# Patient Record
Sex: MALE | Race: WHITE | NOT HISPANIC OR LATINO | URBAN - METROPOLITAN AREA
[De-identification: names, ages, dates, MRNs, and addresses within clinical notes are randomized per-mention and may not be internally consistent; named-entity substitution may affect disease eponyms.]

---

## 2018-01-30 ENCOUNTER — INPATIENT (INPATIENT)
Facility: HOSPITAL | Age: 83
LOS: 6 days | Discharge: HOME | DRG: 315 | End: 2018-02-06
Attending: INTERNAL MEDICINE | Admitting: INTERNAL MEDICINE
Payer: MEDICARE

## 2018-01-30 VITALS
HEART RATE: 97 BPM | SYSTOLIC BLOOD PRESSURE: 110 MMHG | RESPIRATION RATE: 20 BRPM | DIASTOLIC BLOOD PRESSURE: 52 MMHG | OXYGEN SATURATION: 99 %

## 2018-01-30 DIAGNOSIS — J44.9 CHRONIC OBSTRUCTIVE PULMONARY DISEASE, UNSPECIFIED: ICD-10-CM
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
## 2018-01-30 DIAGNOSIS — I48.91 UNSPECIFIED ATRIAL FIBRILLATION: ICD-10-CM

## 2018-01-30 LAB
ALBUMIN SERPL ELPH-MCNC: 3.8 G/DL — SIGNIFICANT CHANGE UP (ref 3.3–5)
ALP SERPL-CCNC: 141 U/L — HIGH (ref 40–120)
ALT FLD-CCNC: 8 U/L RC — LOW (ref 10–45)
APTT BLD: 48.3 SEC — HIGH (ref 27.5–37.4)
AST SERPL-CCNC: 28 U/L — SIGNIFICANT CHANGE UP (ref 10–40)
BASE EXCESS BLDV CALC-SCNC: -3.5 MMOL/L — LOW (ref -2–2)
BASOPHILS # BLD AUTO: 0 K/UL — SIGNIFICANT CHANGE UP (ref 0–0.2)
BASOPHILS NFR BLD AUTO: 0.1 % — SIGNIFICANT CHANGE UP (ref 0–2)
BILIRUB SERPL-MCNC: 2.4 MG/DL — HIGH (ref 0.2–1.2)
BUN SERPL-MCNC: 57 MG/DL — HIGH (ref 7–23)
CA-I SERPL-SCNC: 1.1 MMOL/L — LOW (ref 1.12–1.3)
CALCIUM SERPL-MCNC: 8.9 MG/DL — SIGNIFICANT CHANGE UP (ref 8.4–10.5)
CHLORIDE BLDV-SCNC: 107 MMOL/L — SIGNIFICANT CHANGE UP (ref 96–108)
CHLORIDE SERPL-SCNC: 101 MMOL/L — SIGNIFICANT CHANGE UP (ref 96–108)
CK MB BLD-MCNC: 1.2 % — SIGNIFICANT CHANGE UP (ref 0–3.5)
CK MB CFR SERPL CALC: 7.4 NG/ML — HIGH (ref 0–6.7)
CK SERPL-CCNC: 636 U/L — HIGH (ref 30–200)
CO2 BLDV-SCNC: 22 MMOL/L — SIGNIFICANT CHANGE UP (ref 22–30)
CO2 SERPL-SCNC: 20 MMOL/L — LOW (ref 22–31)
CREAT SERPL-MCNC: 2.26 MG/DL — HIGH (ref 0.5–1.3)
EOSINOPHIL # BLD AUTO: 0 K/UL — SIGNIFICANT CHANGE UP (ref 0–0.5)
EOSINOPHIL NFR BLD AUTO: 0.1 % — SIGNIFICANT CHANGE UP (ref 0–6)
GAS PNL BLDV: 136 MMOL/L — SIGNIFICANT CHANGE UP (ref 136–145)
GAS PNL BLDV: SIGNIFICANT CHANGE UP
GAS PNL BLDV: SIGNIFICANT CHANGE UP
GLUCOSE BLDV-MCNC: 119 MG/DL — HIGH (ref 70–99)
GLUCOSE SERPL-MCNC: 126 MG/DL — HIGH (ref 70–99)
HCO3 BLDV-SCNC: 21 MMOL/L — SIGNIFICANT CHANGE UP (ref 21–29)
HCT VFR BLD CALC: 31.7 % — LOW (ref 39–50)
HCT VFR BLDA CALC: 34 % — LOW (ref 39–50)
HGB BLD CALC-MCNC: 11.1 G/DL — LOW (ref 13–17)
HGB BLD-MCNC: 11.1 G/DL — LOW (ref 13–17)
HOROWITZ INDEX BLDV+IHG-RTO: SIGNIFICANT CHANGE UP
HYPOCHROMIA BLD QL: SLIGHT — SIGNIFICANT CHANGE UP
INR BLD: 4.42 RATIO — HIGH (ref 0.88–1.16)
LACTATE BLDV-MCNC: 2.3 MMOL/L — HIGH (ref 0.7–2)
LYMPHOCYTES # BLD AUTO: 1 K/UL — SIGNIFICANT CHANGE UP (ref 1–3.3)
LYMPHOCYTES # BLD AUTO: 6.3 % — LOW (ref 13–44)
MCHC RBC-ENTMCNC: 32.4 PG — SIGNIFICANT CHANGE UP (ref 27–34)
MCHC RBC-ENTMCNC: 34.9 GM/DL — SIGNIFICANT CHANGE UP (ref 32–36)
MCV RBC AUTO: 92.8 FL — SIGNIFICANT CHANGE UP (ref 80–100)
MONOCYTES # BLD AUTO: 1.7 K/UL — HIGH (ref 0–0.9)
MONOCYTES NFR BLD AUTO: 10.8 % — SIGNIFICANT CHANGE UP (ref 2–14)
NEUTROPHILS # BLD AUTO: 13.1 K/UL — HIGH (ref 1.8–7.4)
NEUTROPHILS NFR BLD AUTO: 82.7 % — HIGH (ref 43–77)
PCO2 BLDV: 39 MMHG — SIGNIFICANT CHANGE UP (ref 35–50)
PH BLDV: 7.35 — SIGNIFICANT CHANGE UP (ref 7.35–7.45)
PLAT MORPH BLD: NORMAL — SIGNIFICANT CHANGE UP
PLATELET # BLD AUTO: 222 K/UL — SIGNIFICANT CHANGE UP (ref 150–400)
PO2 BLDV: 20 MMHG — LOW (ref 25–45)
POTASSIUM BLDV-SCNC: 4.4 MMOL/L — SIGNIFICANT CHANGE UP (ref 3.5–5)
POTASSIUM SERPL-MCNC: 4.4 MMOL/L — SIGNIFICANT CHANGE UP (ref 3.5–5.3)
POTASSIUM SERPL-SCNC: 4.4 MMOL/L — SIGNIFICANT CHANGE UP (ref 3.5–5.3)
PROT SERPL-MCNC: 7.4 G/DL — SIGNIFICANT CHANGE UP (ref 6–8.3)
PROTHROM AB SERPL-ACNC: 49.7 SEC — HIGH (ref 9.8–12.7)
RAPID RVP RESULT: SIGNIFICANT CHANGE UP
RBC # BLD: 3.42 M/UL — LOW (ref 4.2–5.8)
RBC # FLD: 14.6 % — HIGH (ref 10.3–14.5)
RBC BLD AUTO: SIGNIFICANT CHANGE UP
SAO2 % BLDV: 23 % — LOW (ref 67–88)
SODIUM SERPL-SCNC: 139 MMOL/L — SIGNIFICANT CHANGE UP (ref 135–145)
TROPONIN T SERPL-MCNC: 0.08 NG/ML — HIGH (ref 0–0.06)
WBC # BLD: 15.8 K/UL — HIGH (ref 3.8–10.5)
WBC # FLD AUTO: 15.8 K/UL — HIGH (ref 3.8–10.5)

## 2018-01-30 PROCEDURE — 71045 X-RAY EXAM CHEST 1 VIEW: CPT | Mod: 26

## 2018-01-30 PROCEDURE — 73502 X-RAY EXAM HIP UNI 2-3 VIEWS: CPT | Mod: 26,LT

## 2018-01-30 PROCEDURE — 72125 CT NECK SPINE W/O DYE: CPT | Mod: 26

## 2018-01-30 PROCEDURE — 76377 3D RENDER W/INTRP POSTPROCES: CPT | Mod: 26

## 2018-01-30 PROCEDURE — 93010 ELECTROCARDIOGRAM REPORT: CPT

## 2018-01-30 PROCEDURE — 70450 CT HEAD/BRAIN W/O DYE: CPT | Mod: 26

## 2018-01-30 PROCEDURE — 99285 EMERGENCY DEPT VISIT HI MDM: CPT | Mod: 25,GC

## 2018-01-30 PROCEDURE — 72192 CT PELVIS W/O DYE: CPT | Mod: 26

## 2018-01-30 RX ORDER — SACUBITRIL AND VALSARTAN 24; 26 MG/1; MG/1
1 TABLET, FILM COATED ORAL
Qty: 0 | Refills: 0 | Status: DISCONTINUED | OUTPATIENT
Start: 2018-01-30 | End: 2018-02-01

## 2018-01-30 RX ORDER — PANTOPRAZOLE SODIUM 20 MG/1
40 TABLET, DELAYED RELEASE ORAL
Qty: 0 | Refills: 0 | Status: DISCONTINUED | OUTPATIENT
Start: 2018-01-30 | End: 2018-02-06

## 2018-01-30 RX ORDER — ALBUTEROL 90 UG/1
2 AEROSOL, METERED ORAL EVERY 6 HOURS
Qty: 0 | Refills: 0 | Status: DISCONTINUED | OUTPATIENT
Start: 2018-01-30 | End: 2018-02-06

## 2018-01-30 RX ORDER — ATORVASTATIN CALCIUM 80 MG/1
40 TABLET, FILM COATED ORAL AT BEDTIME
Qty: 0 | Refills: 0 | Status: DISCONTINUED | OUTPATIENT
Start: 2018-01-30 | End: 2018-02-06

## 2018-01-30 RX ORDER — FUROSEMIDE 40 MG
40 TABLET ORAL DAILY
Qty: 0 | Refills: 0 | Status: DISCONTINUED | OUTPATIENT
Start: 2018-01-30 | End: 2018-02-01

## 2018-01-30 RX ORDER — TIOTROPIUM BROMIDE 18 UG/1
1 CAPSULE ORAL; RESPIRATORY (INHALATION) DAILY
Qty: 0 | Refills: 0 | Status: DISCONTINUED | OUTPATIENT
Start: 2018-01-30 | End: 2018-02-06

## 2018-01-30 RX ORDER — ALLOPURINOL 300 MG
100 TABLET ORAL DAILY
Qty: 0 | Refills: 0 | Status: DISCONTINUED | OUTPATIENT
Start: 2018-01-30 | End: 2018-02-06

## 2018-01-30 RX ORDER — METOPROLOL TARTRATE 50 MG
50 TABLET ORAL DAILY
Qty: 0 | Refills: 0 | Status: DISCONTINUED | OUTPATIENT
Start: 2018-01-30 | End: 2018-02-01

## 2018-01-30 RX ORDER — DOCUSATE SODIUM 100 MG
100 CAPSULE ORAL THREE TIMES A DAY
Qty: 0 | Refills: 0 | Status: DISCONTINUED | OUTPATIENT
Start: 2018-01-30 | End: 2018-02-06

## 2018-01-30 RX ORDER — CLOPIDOGREL BISULFATE 75 MG/1
75 TABLET, FILM COATED ORAL DAILY
Qty: 0 | Refills: 0 | Status: DISCONTINUED | OUTPATIENT
Start: 2018-01-30 | End: 2018-02-06

## 2018-01-30 RX ORDER — IPRATROPIUM/ALBUTEROL SULFATE 18-103MCG
3 AEROSOL WITH ADAPTER (GRAM) INHALATION ONCE
Qty: 0 | Refills: 0 | Status: COMPLETED | OUTPATIENT
Start: 2018-01-30 | End: 2018-01-30

## 2018-01-30 RX ADMIN — ATORVASTATIN CALCIUM 40 MILLIGRAM(S): 80 TABLET, FILM COATED ORAL at 22:22

## 2018-01-30 RX ADMIN — SACUBITRIL AND VALSARTAN 1 TABLET(S): 24; 26 TABLET, FILM COATED ORAL at 22:22

## 2018-01-30 RX ADMIN — Medication 100 MILLIGRAM(S): at 22:33

## 2018-01-30 RX ADMIN — Medication 3 MILLILITER(S): at 11:34

## 2018-01-30 RX ADMIN — ALBUTEROL 2 PUFF(S): 90 AEROSOL, METERED ORAL at 22:24

## 2018-01-30 RX ADMIN — Medication 50 MILLIGRAM(S): at 22:22

## 2018-01-30 NOTE — H&P ADULT - HISTORY OF PRESENT ILLNESS
88M pmhx htn, afib, chf, copd, here w/ L hip pain s/p fall while getting dressed this morning. Per pt he does not remember the incident very well but does not believe he lost consciousness or hit his head. He is on bloodthinners (plavix and coumadin) for his aFib. Pt denies precipitating sx including cp or dizziness prior to the fall. At baseline pt is sob requiring 3 duonebs per day for his COPD. He is complaining of his usual sob today as well. Pt also denies recent f/c, ap/n/v/d, headaches, balance issues, cp 88M pmhx htn, afib, chf, copd, here w/ L hip pain s/p fall while getting dressed this morning. Per pt he does not remember the incident very well but does not believe he lost consciousness or hit his head. He is on bloodthinners (plavix and coumadin) for his aFib. Pt denies precipitating sx including cp or dizziness prior to the fall. At baseline pt is sob requiring 3 duonebs per day for his COPD. He is complaining of his usual sob today as well. Pt also denies recent f/c, ap/n/v/d, headaches, balance issues, or any other associated symptoms

## 2018-01-30 NOTE — H&P ADULT - NSHPLABSRESULTS_GEN_ALL_CORE
Lab Results:  CBC  CBC Full  -  ( 30 Jan 2018 10:29 )  WBC Count : 15.8 K/uL  Hemoglobin : 11.1 g/dL  Hematocrit : 31.7 %  Platelet Count - Automated : 222 K/uL  Mean Cell Volume : 92.8 fl  Mean Cell Hemoglobin : 32.4 pg  Mean Cell Hemoglobin Concentration : 34.9 gm/dL  Auto Neutrophil # : 13.1 K/uL  Auto Lymphocyte # : 1.0 K/uL  Auto Monocyte # : 1.7 K/uL  Auto Eosinophil # : 0.0 K/uL  Auto Basophil # : 0.0 K/uL  Auto Neutrophil % : 82.7 %  Auto Lymphocyte % : 6.3 %  Auto Monocyte % : 10.8 %  Auto Eosinophil % : 0.1 %  Auto Basophil % : 0.1 %    .		Differential:	[] Automated		[] Manual  Chemistry                        11.1   15.8  )-----------( 222      ( 30 Jan 2018 10:29 )             31.7     01-30    139  |  101  |  57<H>  ----------------------------<  126<H>  4.4   |  20<L>  |  2.26<H>    Ca    8.9      30 Jan 2018 10:29    TPro  7.4  /  Alb  3.8  /  TBili  2.4<H>  /  DBili  x   /  AST  28  /  ALT  8<L>  /  AlkPhos  141<H>  01-30    LIVER FUNCTIONS - ( 30 Jan 2018 10:29 )  Alb: 3.8 g/dL / Pro: 7.4 g/dL / ALK PHOS: 141 U/L / ALT: 8 U/L RC / AST: 28 U/L / GGT: x           PT/INR - ( 30 Jan 2018 10:29 )   PT: 49.7 sec;   INR: 4.42 ratio         PTT - ( 30 Jan 2018 10:29 )  PTT:48.3 sec          MICROBIOLOGY/CULTURES:      RADIOLOGY RESULTS: reviewed

## 2018-01-30 NOTE — ED PROVIDER NOTE - ATTENDING CONTRIBUTION TO CARE
Patient poor historian.  BIBEMS from assisted living after fall, c/o L hip pain.  Patient cannot give any details of fall to me.  C/O severe pain in L hip.  Also reporting shortness of breath, has baseline COPD.    On exam patient tachycardic, tachypneic, mildly hypotensive but awake and alert.  Irregular tachycardia, lungs diffusely wheezy with increased WOB, abdomen soft, non tender, L hip diffusely tenderness to palpation primarily over pubic ramus, no visible shortening or deformity.    Suspect underlying pathology causing fall, possible hypoxia secondary to COPD exacerbation given presenation versus cardiac etiology, plan for labs, duonebs, CXR, pelvis XR, screening CT head/c spine (no visible trauma but elderly and poor historian), pain control, RVP

## 2018-01-30 NOTE — ED PROVIDER NOTE - MEDICAL DECISION MAKING DETAILS
88M pmhx afib on coumadin, plavix, htn, chf, coming from assisted living facility here s/p fall while getting dressed this AM. Pt does not remember incident. On exam pt ttp in left hip. Difficult to range passively. No gross deformity. Plan for ct head, neck, cxr, xray L hip + pelvis, cbc, cmp, vbg, coags.

## 2018-01-30 NOTE — H&P ADULT - NSHPPHYSICALEXAM_GEN_ALL_CORE
General frail  PERRLA  Neurology: A&Ox3, nonfocal, [place] x 4  Respiratory: CTA B/L  CV: RRR, S1S2, no murmurs, rubs or gallops  Abdominal: Soft, NT, ND +BS, Last BM  Extremities: No edema, + peripheral pulses  Skin Normal

## 2018-01-30 NOTE — ED PROVIDER NOTE - OBJECTIVE STATEMENT
88M pmhx htn, afib, chf, copd, here s/p fall while getting dressed this morning. Per pt he does not remember the incident very well but does not believe he lost consciousness or hit his head. He is on bloodthinners (plavix and coumadin) for his aFib. Pt denies precipitating sx including cp or dizziness prior to the fall. Pt 88M pmhx htn, afib, chf, copd, here w/ L hip pain s/p fall while getting dressed this morning. Per pt he does not remember the incident very well but does not believe he lost consciousness or hit his head. He is on bloodthinners (plavix and coumadin) for his aFib. Pt denies precipitating sx including cp or dizziness prior to the fall. At baseline pt is sob requiring 3 duonebs per day for his COPD. He is complaining of his usual sob today as well. Pt also denies recent f/c, ap/n/v/d, headaches, balance issues, cp.

## 2018-01-30 NOTE — H&P ADULT - ASSESSMENT
88M pmhx htn, afib, chf, copd, here w/ L hip pain s/p fall while getting dressed this morning. Per pt he does not remember the incident very well but does not believe he lost consciousness or hit his head. He is on bloodthinners (plavix and coumadin) for his aFib. Pt denies precipitating sx including cp or dizziness prior to the fall. At baseline pt is sob requiring 3 duonebs per day for his COPD. He is complaining of his usual sob today as well. Pt also denies recent f/c, ap/n/v/d, headaches, balance issues, or any other associated symptoms

## 2018-01-30 NOTE — ED PROVIDER NOTE - PMH
Afib    CHF (congestive heart failure)    COPD (chronic obstructive pulmonary disease)    HTN (hypertension)

## 2018-01-30 NOTE — ED ADULT NURSE NOTE - OBJECTIVE STATEMENT
88y male BIBA from [facility] s/p fall. A&Ox3. Hx of HTN, CHF, COPD, GERD, HLD and afib on coumadin and plavix. EMS reports patient fell out of bed (1 foot off ground) this morning. 88y male BIBA from [facility] s/p fall. A&Ox3. Hx of HTN, CHF, COPD, GERD, HLD and afib on coumadin and plavix. EMS reports patient fell out of bed (1 foot off ground) this morning while trying to get dressed. Walks with walker at baseline. Patient did not hit head, no LOC. Patient c/o left hip pain only upon coughing. Denies taking pain medicine. No obvious deformities noted. Left leg is neither shortened or rotated. Patient also c/o dyspnea, takes 3 duonebs daily. EMS report b/l expiratory wheezes, administered 2 duonebs. Patient presents with b/l expiratory wheezes. Reports sob upon exertion. Denies chest pain, fever/chills, n/v/d.

## 2018-01-31 LAB
ALBUMIN SERPL ELPH-MCNC: 3.1 G/DL — LOW (ref 3.3–5)
ALP SERPL-CCNC: 121 U/L — HIGH (ref 40–120)
ALT FLD-CCNC: 12 U/L — SIGNIFICANT CHANGE UP (ref 10–45)
ANION GAP SERPL CALC-SCNC: 16 MMOL/L — SIGNIFICANT CHANGE UP (ref 5–17)
APTT BLD: 45.4 SEC — HIGH (ref 27.5–37.4)
AST SERPL-CCNC: 48 U/L — HIGH (ref 10–40)
BILIRUB DIRECT SERPL-MCNC: 0.4 MG/DL — HIGH (ref 0–0.2)
BILIRUB INDIRECT FLD-MCNC: 1.3 MG/DL — HIGH (ref 0.2–1)
BILIRUB SERPL-MCNC: 1.7 MG/DL — HIGH (ref 0.2–1.2)
BUN SERPL-MCNC: 58 MG/DL — HIGH (ref 7–23)
CALCIUM SERPL-MCNC: 8.4 MG/DL — SIGNIFICANT CHANGE UP (ref 8.4–10.5)
CHLORIDE SERPL-SCNC: 103 MMOL/L — SIGNIFICANT CHANGE UP (ref 96–108)
CK SERPL-CCNC: 815 U/L — HIGH (ref 30–200)
CO2 SERPL-SCNC: 20 MMOL/L — LOW (ref 22–31)
CREAT SERPL-MCNC: 1.78 MG/DL — HIGH (ref 0.5–1.3)
GLUCOSE SERPL-MCNC: 93 MG/DL — SIGNIFICANT CHANGE UP (ref 70–99)
HCT VFR BLD CALC: 29.5 % — LOW (ref 39–50)
HGB BLD-MCNC: 9.7 G/DL — LOW (ref 13–17)
INR BLD: 4.48 RATIO — HIGH (ref 0.88–1.16)
MCHC RBC-ENTMCNC: 28.8 PG — SIGNIFICANT CHANGE UP (ref 27–34)
MCHC RBC-ENTMCNC: 32.9 GM/DL — SIGNIFICANT CHANGE UP (ref 32–36)
MCV RBC AUTO: 87.5 FL — SIGNIFICANT CHANGE UP (ref 80–100)
PLATELET # BLD AUTO: 246 K/UL — SIGNIFICANT CHANGE UP (ref 150–400)
POTASSIUM SERPL-MCNC: 4.4 MMOL/L — SIGNIFICANT CHANGE UP (ref 3.5–5.3)
POTASSIUM SERPL-SCNC: 4.4 MMOL/L — SIGNIFICANT CHANGE UP (ref 3.5–5.3)
PROT SERPL-MCNC: 6.6 G/DL — SIGNIFICANT CHANGE UP (ref 6–8.3)
PROTHROM AB SERPL-ACNC: 52.2 SEC — HIGH (ref 10–13.1)
RBC # BLD: 3.37 M/UL — LOW (ref 4.2–5.8)
RBC # FLD: 15.5 % — HIGH (ref 10.3–14.5)
SODIUM SERPL-SCNC: 139 MMOL/L — SIGNIFICANT CHANGE UP (ref 135–145)
WBC # BLD: 14.15 K/UL — HIGH (ref 3.8–10.5)
WBC # FLD AUTO: 14.15 K/UL — HIGH (ref 3.8–10.5)

## 2018-01-31 PROCEDURE — 95819 EEG AWAKE AND ASLEEP: CPT | Mod: 26

## 2018-01-31 RX ORDER — TAMSULOSIN HYDROCHLORIDE 0.4 MG/1
0.4 CAPSULE ORAL AT BEDTIME
Qty: 0 | Refills: 0 | Status: DISCONTINUED | OUTPATIENT
Start: 2018-01-31 | End: 2018-02-06

## 2018-01-31 RX ORDER — ACETAMINOPHEN 500 MG
650 TABLET ORAL ONCE
Qty: 0 | Refills: 0 | Status: COMPLETED | OUTPATIENT
Start: 2018-01-31 | End: 2018-01-31

## 2018-01-31 RX ORDER — MESALAMINE 400 MG
800 TABLET, DELAYED RELEASE (ENTERIC COATED) ORAL
Qty: 0 | Refills: 0 | Status: DISCONTINUED | OUTPATIENT
Start: 2018-01-31 | End: 2018-02-06

## 2018-01-31 RX ADMIN — ATORVASTATIN CALCIUM 40 MILLIGRAM(S): 80 TABLET, FILM COATED ORAL at 22:10

## 2018-01-31 RX ADMIN — Medication 40 MILLIGRAM(S): at 05:33

## 2018-01-31 RX ADMIN — Medication 100 MILLIGRAM(S): at 05:33

## 2018-01-31 RX ADMIN — TAMSULOSIN HYDROCHLORIDE 0.4 MILLIGRAM(S): 0.4 CAPSULE ORAL at 22:10

## 2018-01-31 RX ADMIN — Medication 800 MILLIGRAM(S): at 22:09

## 2018-01-31 RX ADMIN — Medication 100 MILLIGRAM(S): at 07:00

## 2018-01-31 RX ADMIN — Medication 100 MILLIGRAM(S): at 11:24

## 2018-01-31 RX ADMIN — CLOPIDOGREL BISULFATE 75 MILLIGRAM(S): 75 TABLET, FILM COATED ORAL at 11:24

## 2018-01-31 RX ADMIN — SACUBITRIL AND VALSARTAN 1 TABLET(S): 24; 26 TABLET, FILM COATED ORAL at 05:33

## 2018-01-31 RX ADMIN — ALBUTEROL 2 PUFF(S): 90 AEROSOL, METERED ORAL at 17:10

## 2018-01-31 RX ADMIN — ALBUTEROL 2 PUFF(S): 90 AEROSOL, METERED ORAL at 11:22

## 2018-01-31 RX ADMIN — Medication 50 MILLIGRAM(S): at 05:33

## 2018-01-31 RX ADMIN — Medication 650 MILLIGRAM(S): at 17:07

## 2018-01-31 RX ADMIN — TIOTROPIUM BROMIDE 1 CAPSULE(S): 18 CAPSULE ORAL; RESPIRATORY (INHALATION) at 11:23

## 2018-01-31 RX ADMIN — SACUBITRIL AND VALSARTAN 1 TABLET(S): 24; 26 TABLET, FILM COATED ORAL at 17:10

## 2018-01-31 RX ADMIN — Medication 650 MILLIGRAM(S): at 17:30

## 2018-01-31 RX ADMIN — ALBUTEROL 2 PUFF(S): 90 AEROSOL, METERED ORAL at 05:33

## 2018-01-31 RX ADMIN — ALBUTEROL 2 PUFF(S): 90 AEROSOL, METERED ORAL at 22:13

## 2018-01-31 RX ADMIN — PANTOPRAZOLE SODIUM 40 MILLIGRAM(S): 20 TABLET, DELAYED RELEASE ORAL at 06:00

## 2018-01-31 NOTE — EEG REPORT - NS EEG TEXT BOX
[facility]   COMPREHENSIVE EPILEPSY CENTER   REPORT OF ROUTINE VIDEO EEG     [facility]: [address] [place] [address] [address], [place] [address], Ph#: [phone]  [facility]: [address], [place] [address], Ph#: [phone]  Office: [address] [facility], [address], [place] [address] Ph#: [phone]    Patient Name: KATTY GONSALEZ  Age and : 88y ([birth year])  MRN #: [id number]  Location: [facility] 4MON 401 D1    Study Date: 18    _____________________________________________________________  TECHNICAL INFORMATION    EEG Placement and Labeling of Electrodes:  The EEG was performed utilizing 20 channels referential EEG connections (coronal over temporal over parasagittal montage) using all standard 10-20 electrode placements with EKG.  Recording was at a sampling rate of 256 samples per second per channel.  Time synchronized digital video recording was done simultaneously with EEG recording.  A low light infrared camera was used for low light recording.  [place] and seizure detection algorithms were utilized.    _____________________________________________________________  HISTORY:  Patient is a 88y old  Male who presents with a chief complaint of L hip pain (2018 16:41)    PERTINENT MEDICATION:  None    _____________________________________________________________  STUDY INTERPRETATION    Background:  The background was continuous, spontaneously variable, reactive, and symmetric. Entire recording in drowsy and asleep states, predominantly consisted of theta and delta activities. No posteriorly dominant rhythm seen.    Sleep:  - Drowsiness was characterized by fragmentation, attenuation, and slowing of the background activity.    - Sleep was characterized by the presence of symmetric and normal vertex waves, sleep spindles, and K-complexes.    Non-epileptiform Paroxysmal Abnormalities:  None    Interictal Epileptiform Abnormalities:   None    Ictal Abnormalities:   No clinical events.  No electrographic seizures.    Activation Procedures:   Hyperventilation was not performed.    Photic stimulation was not performed.     Artifacts:  Intermittent myogenic and movement artifacts were noted.    ECG:  The heart rate on single channel ECG was predominantly between 80-90 BPM.    _____________________________________________________________  EEG CLASSIFICATION/SUMMARY:    Normal EEG in the drowsy and asleep states.    _____________________________________________________________  CLINICAL IMPRESSION:    Normal EEG study in the drowsy and asleep states.  No epileptic pattern or seizure seen.      Sj Eid MD  Attending Physician, [facility] [place]

## 2018-01-31 NOTE — CONSULT NOTE ADULT - ASSESSMENT
EKG - Afib LBBB    A/P     1) Syncope - will moniter on tele, get 2decho and get orthostatics , CT head, neurology on board    2) Chronic systolic CHF - on toprol, entresto, lasix, euvolemic, get 2d echo    3) Chronic afib - on coumadin INR > 4 hold coumadin for now    4) Leukocytosis - ?infection maybe get UA urine and blood cx    5) cad s/p PCI - no chest pains cont plavix

## 2018-01-31 NOTE — CONSULT NOTE ADULT - SUBJECTIVE AND OBJECTIVE BOX
[facility]        Patient is a 88y old  Male who presents with a chief complaint of L hip pain (2018 16:41)      HPI:  88M pmhx htn, afib, chf, copd, small left frontal infarct 3 years ago with mild expressive aphasia here w/ L hip pain s/p fall while getting dressed this morning. Per pt he does not remember the incident very well but does not believe he lost consciousness or hit his head. He is on bloodthinners (plavix and coumadin) for his aFib. Pt denies precipitating sx including cp or dizziness prior to the fall. At baseline pt is sob requiring 3 duonebs per day for his COPD. He is complaining of his usual sob today as well. Pt also denies recent f/c, ap/n/v/d, headaches, balance issues, or any other associated symptoms    Pt reportedly felt weak the night before, and went to bed early.  He does not recall the turn of events on the date of the admission. He was found on the floor unresponsive by the  at the [facility], he is unsure when he woke up and whether he had breakfast that morning.   According to daughter at bedside, she reports that he had recently developed mild renal insufficiency and was noted to have elevated potassium. His diet was changed since to low potassium.  According to the daughter, she notes mild b/l arm tremors today which were not previously seen.                  *****PAST MEDICAL / Surgical  HISTORY:  PAST MEDICAL & SURGICAL HISTORY:  Afib  HTN (hypertension)  CHF (congestive heart failure)  COPD (chronic obstructive pulmonary disease)  PAD (peripheral artery disease)  Hypercholesterolemia  Atrial fibrillation  Hypertension  No significant past surgical history  Abnormal coronary angiogram: 3 different times had a total of 5 stents placed  S/P aortic valve repair           *****FAMILY HISTORY:  FAMILY HISTORY:  No pertinent family history in first degree relatives  Family history of cerebrovascular accident           *****SOCIAL HISTORY:  Alcohol: None  Smoking: None         *****ALLERGIES:   Allergies    Apresoline (Unknown)  penicillin (Unknown)    Intolerances             *****MEDICATIONS: current medication reviewed and documented.   MEDICATIONS  (STANDING):  ALBUTerol    90 MICROgram(s) HFA Inhaler 2 Puff(s) Inhalation every 6 hours  allopurinol 100 milliGRAM(s) Oral daily  atorvastatin 40 milliGRAM(s) Oral at bedtime  clopidogrel Tablet 75 milliGRAM(s) Oral daily  docusate sodium 100 milliGRAM(s) Oral three times a day  furosemide    Tablet 40 milliGRAM(s) Oral daily  metoprolol succinate ER 50 milliGRAM(s) Oral daily  pantoprazole    Tablet 40 milliGRAM(s) Oral before breakfast  sacubitril 49 mG/valsartan 51 mG 1 Tablet(s) Oral two times a day  tamsulosin 0.4 milliGRAM(s) Oral at bedtime  tiotropium 18 MICROgram(s) Capsule 1 Capsule(s) Inhalation daily    MEDICATIONS  (PRN):           *****REVIEW OF SYSTEM:  GEN: no fever, no chills, no pain  RESP: no SOB, no cough, no sputum  CVS: no chest pain, no palpitations, no edema  GI: no abdominal pain, no nausea, no vomiting, no constipation, no diarrhea  : no dysurea, no frequency, no hematurea  Neuro: no headache, no dizziness  PSYCH: no anxiety, no depression  Derm : no itching, no rash         *****VITAL SIGNS:  T(C): 36.8 (18 @ 11:10), Max: 37.2 (18 @ 21:52)  HR: 101 (18 @ 11:10) (87 - 101)  BP: 94/54 (18 @ 11:10) (94/54 - 131/68)  RR: 17 (18 @ 11:10) (16 - 18)  SpO2: 98% (18 @ 11:10) (96% - 100%)  Wt(kg): --     @ 07:  -   @ 07:00  --------------------------------------------------------  IN: 945 mL / OUT: 350 mL / NET: 595 mL     @ 07:  -   @ 12:13  --------------------------------------------------------  IN: 480 mL / OUT: 100 mL / NET: 380 mL             *****PHYSICAL EXAM:     Alert oriented x 2 ( did not know the year or date)  Attention comprehension are fair. Able to name, repeat, read without any difficulty.   Able to follow 3 step commands.     EOMI fundi not visualized,  VFF to confrontration  No facial asymmetry   Tongue is midline   Palate elevates symmetrically   Moving both upper ext symmetrically no pronator drift.  RLE prox 3/5 distally 4/5   LLE prox 2/5 severely limited by pain ( fell on the left hip)    Gait : not assessed. usually walks with a walker.  B/L down going toes   b/l intention tremors/postural tremors  Also noted to have muscle fasciculations of his left upper ext.     >>> <<<         *****LAB AND IMAGIN.7    14.15 )-----------( 246      ( 2018 07:18 )             29.5                   139  |  103  |  58<H>  ----------------------------<  93  4.4   |  20<L>  |  1.78<H>    Ca    8.4      2018 07:34    TPro  7.4  /  Alb  3.8  /  TBili  2.4<H>  /  DBili  x   /  AST  28  /  ALT  8<L>  /  AlkPhos  141<H>  30    PT/INR - ( 2018 07:18 )   PT: 52.2 sec;   INR: 4.48 ratio         PTT - ( 2018 07:18 )  PTT:45.4 sec            CARDIAC MARKERS ( 2018 10:29 )  x     / 0.08 ng/mL / 636 U/L / x     / 7.4 ng/mL              < from: CT Head No Cont (18 @ 13:26) >  Head CT: No evidence for intracranial hemorrhage, mass effect, or   displaced calvarial fracture.    Cervical spine CT: No evidence for acute displaced fracture or traumatic   malalignment. Cervical degenerative spondylosis, as described above.       < end of copied text >      [All pertinent recent Imaging reports reviewed]         *****A S S E S S M E N T   A N D   P L A N :      88M pmhx htn, afib, chf, copd, small left frontal infarct 3 years ago with mild expressive aphasia here w/ L hip pain s/p fall while getting dressed this morning. Per pt he does not remember the incident very well but does not believe he lost consciousness or hit his head. He is on bloodthinners (plavix and coumadin) for his aFib. Pt denies precipitating sx including cp or dizziness prior to the fall. At baseline pt is sob requiring 3 duonebs per day for his COPD. He is complaining of his usual sob today as well. Pt also denies recent f/c, ap/n/v/d, headaches, balance issues, or any other associated symptoms    Pt reportedly felt weak the night before, and went to bed early.  He does not recall the turn of events on the date of the admission. He was found on the floor unresponsive by the  at the [facility], he is unsure when he woke up and whether he had breakfast that morning.   According to daughter at bedside, she reports that he had recently developed mild renal insufficiency and was noted to have elevated potassium. His diet was changed since to low potassium.     syncopal event r/o seizures given prolonged post ictal period. However, pt did not feel well the night before, with acute on chronic renal insufficiency which may have also triggered the syncopal event.   Trend CPK  EEG to r/o seizures.   CT head no acute process  pt consult for gait eval.     Tremors? related muscle weakness vs. related renal insufficiency vs. drug induced.   trend lfts,  Check ammonia level  Albuterol related ? if he on albuterol at home.       80 minutes spent on the total encounter;  more than 50 % of the visit was spent on counseling  and or coordinating care by the attending physician.    Thank you for allowing me to participate in the care of this blessing patient. Please do not hesitate to call me if you have any questions.   ___________________________  Will follow with you.  Thank you,  Mary Alice Santiago MD  Diplomate of the American Board of Neurology and Psychiatry.  Diplomate of the American Board of Vascular Neurology.   [facility]   Ph: [phone]      This and subsequent notes were partially created using voice recognition software and will  inherently be subject to errors including those of syntax and sound alike substitutions which may escape proofreading. In such instances original meaning may be extrapolated by contextual derivation.

## 2018-01-31 NOTE — PROVIDER CONTACT NOTE (OTHER) - ASSESSMENT
pt A&Ox4, vss, complaining of cough. Pt sounds congested & has infrequent nonproductive cough. Pt requesting cough drops. RVP resulted negative.

## 2018-01-31 NOTE — CONSULT NOTE ADULT - SUBJECTIVE AND OBJECTIVE BOX
Lazaro Ramirez MD  Interventional Cardiology  [place] Office : [address] [place] [address] [place] [address]  Tel:   [place] Office : [address] [facility] [place] [address]  Tel: [phone]  Cell : [phone]      CHIEF COMPLAINT: syncope    HISTORY OF PRESENT ILLNESS:  88M pmhx htn, cad s/p mi s/p 5 stents, s/p TAVR 3 years ago, afib on coumadin, chf, copd, here w/ L hip pain s/p fall while getting dressed this morning. Per pt he does not remember the incident very well but does not believe he lost consciousness or hit his head. He is on bloodthinners (plavix and coumadin) for his aFib. Pt denies precipitating sx including cp or dizziness prior to the fall. At baseline pt is sob requiring 3 duonebs per day for his COPD. He is complaining of his usual sob today as well. Pt also denies recent f/c, ap/n/v/d, headaches, balance issues, or any other associated symptoms    PAST MEDICAL & SURGICAL HISTORY:  Afib  HTN (hypertension)  CHF (congestive heart failure)  COPD (chronic obstructive pulmonary disease)  PAD (peripheral artery disease)  Hypercholesterolemia  Atrial fibrillation  Hypertension  No significant past surgical history  Abnormal coronary angiogram: 3 different times had a total of 5 stents placed  S/P aortic valve repair    	    MEDICATIONS:  clopidogrel Tablet 75 milliGRAM(s) Oral daily  furosemide    Tablet 40 milliGRAM(s) Oral daily  metoprolol succinate ER 50 milliGRAM(s) Oral daily  sacubitril 49 mG/valsartan 51 mG 1 Tablet(s) Oral two times a day  tamsulosin 0.4 milliGRAM(s) Oral at bedtime      ALBUTerol    90 MICROgram(s) HFA Inhaler 2 Puff(s) Inhalation every 6 hours  tiotropium 18 MICROgram(s) Capsule 1 Capsule(s) Inhalation daily      docusate sodium 100 milliGRAM(s) Oral three times a day  mesalamine DR Capsule 800 milliGRAM(s) Oral <User Schedule>  pantoprazole    Tablet 40 milliGRAM(s) Oral before breakfast    allopurinol 100 milliGRAM(s) Oral daily  atorvastatin 40 milliGRAM(s) Oral at bedtime        FAMILY HISTORY:  No pertinent family history in first degree relatives  Family history of cerebrovascular accident        Allergies    Apresoline (Unknown)  penicillin (Unknown)    Intolerances    	      PHYSICAL EXAM:  T(C): 36.8 (01-31-18 @ 11:10), Max: 37.2 (01-30-18 @ 21:52)  HR: 101 (01-31-18 @ 11:10) (88 - 101)  BP: 94/54 (01-31-18 @ 11:10) (94/54 - 131/68)  RR: 17 (01-31-18 @ 11:10) (17 - 18)  SpO2: 98% (01-31-18 @ 11:10) (98% - 100%)  Wt(kg): --  I&O's Summary    30 Jan 2018 07:01  -  31 Jan 2018 07:00  --------------------------------------------------------  IN: 945 mL / OUT: 350 mL / NET: 595 mL    31 Jan 2018 07:01  -  31 Jan 2018 21:14  --------------------------------------------------------  IN: 1200 mL / OUT: 100 mL / NET: 1100 mL        Appearance: Normal	  HEENT:   Normal oral mucosa, PERRL, EOMI	  Cardiovascular: Normal S1 S2, No JVD, 2/6 systolic murmur, No edema  Respiratory: Lungs clear to auscultation	  Gastrointestinal:  Soft, Non-tender, + BS	  Extremities: Normal range of motion, No clubbing, cyanosis or edema    LABS:	 	    CARDIAC MARKERS:                                  9.7    14.15 )-----------( 246      ( 31 Jan 2018 07:18 )             29.5     01-31    139  |  103  |  58<H>  ----------------------------<  93  4.4   |  20<L>  |  1.78<H>    Ca    8.4      31 Jan 2018 07:34    TPro  6.6  /  Alb  3.1<L>  /  TBili  1.7<H>  /  DBili  0.4<H>  /  AST  48<H>  /  ALT  12  /  AlkPhos  121<H>  01-31    proBNP:   Lipid Profile:   HgA1c:   TSH:     ASSESSMENT/PLAN:

## 2018-02-01 DIAGNOSIS — I10 ESSENTIAL (PRIMARY) HYPERTENSION: ICD-10-CM

## 2018-02-01 DIAGNOSIS — E87.1 HYPO-OSMOLALITY AND HYPONATREMIA: ICD-10-CM

## 2018-02-01 DIAGNOSIS — N17.9 ACUTE KIDNEY FAILURE, UNSPECIFIED: ICD-10-CM

## 2018-02-01 LAB
ALBUMIN SERPL ELPH-MCNC: 2.9 G/DL — LOW (ref 3.3–5)
ALP SERPL-CCNC: 119 U/L — SIGNIFICANT CHANGE UP (ref 40–120)
ALT FLD-CCNC: 12 U/L RC — SIGNIFICANT CHANGE UP (ref 10–45)
AMMONIA BLD-MCNC: 37 UMOL/L — SIGNIFICANT CHANGE UP (ref 11–55)
AST SERPL-CCNC: 34 U/L — SIGNIFICANT CHANGE UP (ref 10–40)
BILIRUB SERPL-MCNC: 1.2 MG/DL — SIGNIFICANT CHANGE UP (ref 0.2–1.2)
BUN SERPL-MCNC: 76 MG/DL — HIGH (ref 7–23)
CALCIUM SERPL-MCNC: 8.1 MG/DL — LOW (ref 8.4–10.5)
CHLORIDE SERPL-SCNC: 99 MMOL/L — SIGNIFICANT CHANGE UP (ref 96–108)
CK SERPL-CCNC: 622 U/L — HIGH (ref 30–200)
CO2 SERPL-SCNC: 20 MMOL/L — LOW (ref 22–31)
CREAT SERPL-MCNC: 3.05 MG/DL — HIGH (ref 0.5–1.3)
GLUCOSE SERPL-MCNC: 99 MG/DL — SIGNIFICANT CHANGE UP (ref 70–99)
HCT VFR BLD CALC: 28.6 % — LOW (ref 39–50)
HGB BLD-MCNC: 9.9 G/DL — LOW (ref 13–17)
INR BLD: 5.45 RATIO — CRITICAL HIGH (ref 0.88–1.16)
MCHC RBC-ENTMCNC: 32.2 PG — SIGNIFICANT CHANGE UP (ref 27–34)
MCHC RBC-ENTMCNC: 34.4 GM/DL — SIGNIFICANT CHANGE UP (ref 32–36)
MCV RBC AUTO: 93.5 FL — SIGNIFICANT CHANGE UP (ref 80–100)
PLATELET # BLD AUTO: 184 K/UL — SIGNIFICANT CHANGE UP (ref 150–400)
POTASSIUM SERPL-MCNC: 4.1 MMOL/L — SIGNIFICANT CHANGE UP (ref 3.5–5.3)
POTASSIUM SERPL-SCNC: 4.1 MMOL/L — SIGNIFICANT CHANGE UP (ref 3.5–5.3)
PROT SERPL-MCNC: 6.2 G/DL — SIGNIFICANT CHANGE UP (ref 6–8.3)
PROTHROM AB SERPL-ACNC: 60.9 SEC — HIGH (ref 9.8–12.7)
RBC # BLD: 3.06 M/UL — LOW (ref 4.2–5.8)
RBC # FLD: 14.3 % — SIGNIFICANT CHANGE UP (ref 10.3–14.5)
SODIUM SERPL-SCNC: 133 MMOL/L — LOW (ref 135–145)
WBC # BLD: 9.7 K/UL — SIGNIFICANT CHANGE UP (ref 3.8–10.5)
WBC # FLD AUTO: 9.7 K/UL — SIGNIFICANT CHANGE UP (ref 3.8–10.5)

## 2018-02-01 PROCEDURE — 73610 X-RAY EXAM OF ANKLE: CPT | Mod: 26,RT

## 2018-02-01 RX ORDER — SODIUM CHLORIDE 9 MG/ML
250 INJECTION INTRAMUSCULAR; INTRAVENOUS; SUBCUTANEOUS ONCE
Qty: 0 | Refills: 0 | Status: COMPLETED | OUTPATIENT
Start: 2018-02-01 | End: 2018-02-01

## 2018-02-01 RX ORDER — SODIUM CHLORIDE 9 MG/ML
1000 INJECTION INTRAMUSCULAR; INTRAVENOUS; SUBCUTANEOUS
Qty: 0 | Refills: 0 | Status: DISCONTINUED | OUTPATIENT
Start: 2018-02-01 | End: 2018-02-01

## 2018-02-01 RX ORDER — SODIUM CHLORIDE 9 MG/ML
1000 INJECTION INTRAMUSCULAR; INTRAVENOUS; SUBCUTANEOUS
Qty: 0 | Refills: 0 | Status: DISCONTINUED | OUTPATIENT
Start: 2018-02-01 | End: 2018-02-06

## 2018-02-01 RX ORDER — ACETAMINOPHEN 500 MG
650 TABLET ORAL ONCE
Qty: 0 | Refills: 0 | Status: COMPLETED | OUTPATIENT
Start: 2018-02-01 | End: 2018-02-01

## 2018-02-01 RX ADMIN — TAMSULOSIN HYDROCHLORIDE 0.4 MILLIGRAM(S): 0.4 CAPSULE ORAL at 21:53

## 2018-02-01 RX ADMIN — Medication 50 MILLIGRAM(S): at 06:11

## 2018-02-01 RX ADMIN — Medication 40 MILLIGRAM(S): at 06:11

## 2018-02-01 RX ADMIN — CLOPIDOGREL BISULFATE 75 MILLIGRAM(S): 75 TABLET, FILM COATED ORAL at 12:16

## 2018-02-01 RX ADMIN — ALBUTEROL 2 PUFF(S): 90 AEROSOL, METERED ORAL at 17:32

## 2018-02-01 RX ADMIN — SODIUM CHLORIDE 500 MILLILITER(S): 9 INJECTION INTRAMUSCULAR; INTRAVENOUS; SUBCUTANEOUS at 13:29

## 2018-02-01 RX ADMIN — SODIUM CHLORIDE 500 MILLILITER(S): 9 INJECTION INTRAMUSCULAR; INTRAVENOUS; SUBCUTANEOUS at 12:57

## 2018-02-01 RX ADMIN — ALBUTEROL 2 PUFF(S): 90 AEROSOL, METERED ORAL at 12:17

## 2018-02-01 RX ADMIN — SODIUM CHLORIDE 75 MILLILITER(S): 9 INJECTION INTRAMUSCULAR; INTRAVENOUS; SUBCUTANEOUS at 21:54

## 2018-02-01 RX ADMIN — Medication 100 MILLIGRAM(S): at 12:16

## 2018-02-01 RX ADMIN — TIOTROPIUM BROMIDE 1 CAPSULE(S): 18 CAPSULE ORAL; RESPIRATORY (INHALATION) at 12:17

## 2018-02-01 RX ADMIN — Medication 650 MILLIGRAM(S): at 08:43

## 2018-02-01 RX ADMIN — SODIUM CHLORIDE 40 MILLILITER(S): 9 INJECTION INTRAMUSCULAR; INTRAVENOUS; SUBCUTANEOUS at 17:33

## 2018-02-01 RX ADMIN — ALBUTEROL 2 PUFF(S): 90 AEROSOL, METERED ORAL at 06:11

## 2018-02-01 RX ADMIN — PANTOPRAZOLE SODIUM 40 MILLIGRAM(S): 20 TABLET, DELAYED RELEASE ORAL at 06:11

## 2018-02-01 RX ADMIN — Medication 650 MILLIGRAM(S): at 09:15

## 2018-02-01 RX ADMIN — ATORVASTATIN CALCIUM 40 MILLIGRAM(S): 80 TABLET, FILM COATED ORAL at 21:53

## 2018-02-01 NOTE — CONSULT NOTE ADULT - PROBLEM SELECTOR RECOMMENDATION 9
Etiology?  Likely ATN sec to hemodynamic changes, BP is low  Rule out Pre-renal state, getting NS 75cc/hr  Get UA, Urine Na, Cr, eosinophil, renal US

## 2018-02-01 NOTE — PHYSICAL THERAPY INITIAL EVALUATION ADULT - LIVES WITH, PROFILE
alone/Patient lives alone at the [facility] ([phone], Fax [phone], elevator assisted building, independent in ADLs and ambulates with a walker.

## 2018-02-01 NOTE — CONSULT NOTE ADULT - SUBJECTIVE AND OBJECTIVE BOX
Dr. Dumont  Office [phone]  Cell [phone]  Katie MOORE  Cell [phone]    HPI:  88M pmhx htn, afib, chf, copd, here w/ L hip pain s/p fall while getting dressed. Per pt he does not remember the incident very well but does not believe he lost consciousness or hit his head. He is on bloodthinners (plavix and coumadin) for his aFib. Pt denies precipitating sx including cp or dizziness prior to the fall. At baseline pt is sob requiring 3 duonebs per day for his COPD. He is complaining of his usual sob today as well. Patient renal function has worsened since admission. As per patient he is having good urine output.       Allergies:  Apresoline (Unknown)  penicillin (Unknown)      PAST MEDICAL & SURGICAL HISTORY:  Afib  HTN (hypertension)  CHF (congestive heart failure)  COPD (chronic obstructive pulmonary disease)  PAD (peripheral artery disease)  Hypercholesterolemia  Atrial fibrillation  Hypertension  No significant past surgical history  Abnormal coronary angiogram: 3 different times had a total of 5 stents placed  S/P aortic valve repair      Home Medications Reviewed    Hospital Medications:   MEDICATIONS  (STANDING):  ALBUTerol    90 MICROgram(s) HFA Inhaler 2 Puff(s) Inhalation every 6 hours  allopurinol 100 milliGRAM(s) Oral daily  atorvastatin 40 milliGRAM(s) Oral at bedtime  clopidogrel Tablet 75 milliGRAM(s) Oral daily  docusate sodium 100 milliGRAM(s) Oral three times a day  mesalamine DR Capsule 800 milliGRAM(s) Oral <User Schedule>  pantoprazole    Tablet 40 milliGRAM(s) Oral before breakfast  sodium chloride 0.9%. 1000 milliLiter(s) (75 mL/Hr) IV Continuous <Continuous>  tamsulosin 0.4 milliGRAM(s) Oral at bedtime  tiotropium 18 MICROgram(s) Capsule 1 Capsule(s) Inhalation daily      SOCIAL HISTORY:  Denies ETOh, Smoking,     FAMILY HISTORY:  No pertinent family history in first degree relatives  Family history of cerebrovascular accident      REVIEW OF SYSTEMS:  CONSTITUTIONAL: No weakness, fevers or chills  EYES/ENT: No visual changes;  No vertigo or throat pain   NECK: No pain or stiffness  RESPIRATORY: No cough, wheezing, hemoptysis; No shortness of breath  CARDIOVASCULAR: No chest pain or palpitations.  GASTROINTESTINAL: No abdominal or epigastric pain. No nausea, vomiting, or hematemesis; No diarrhea or constipation. No melena or hematochezia.  GENITOURINARY: No dysuria, frequency, foamy urine, urinary urgency, incontinence or hematuria  NEUROLOGICAL: No numbness or weakness  SKIN: No itching, burning, rashes, or lesions   VASCULAR: No bilateral lower extremity edema.   All other review of systems is negative unless indicated above.    VITALS:  T(F): 97.7 (02-01-18 @ 11:39), Max: 98.2 (02-01-18 @ 04:02)  HR: 91 (02-01-18 @ 16:23)  BP: 98/55 (02-01-18 @ 16:23)  RR: 16 (02-01-18 @ 11:39)  SpO2: 97% (02-01-18 @ 11:39)  Wt(kg): --    01-31 @ 07:01  -  02-01 @ 07:00  --------------------------------------------------------  IN: 1200 mL / OUT: 400 mL / NET: 800 mL    02-01 @ 07:01  -  02-01 @ 21:43  --------------------------------------------------------  IN: 680 mL / OUT: 450 mL / NET: 230 mL          PHYSICAL EXAM:  Constitutional: NAD  HEENT: anicteric sclera, oropharynx clear, MMM  Neck: No JVD  Respiratory: CTAB, no wheezes, rales or rhonchi  Cardiovascular: S1, S2, RRR  Gastrointestinal: BS+, soft, NT/ND  Extremities: No cyanosis or clubbing. No peripheral edema  Neurological: A/O x 3, no focal deficits  Psychiatric: Normal mood, normal affect  : No CVA tenderness. No chamorro.   Skin: No rashes      LABS:  02-01    133<L>  |  99  |  76<H>  ----------------------------<  99  4.1   |  20<L>  |  3.05<H>    Ca    8.1<L>      01 Feb 2018 06:12    TPro  6.2  /  Alb  2.9<L>  /  TBili  1.2  /  DBili      /  AST  34  /  ALT  12  /  AlkPhos  119  02-01    Creatinine Trend: 3.05 <--, 1.78 <--, 2.26 <--                        9.9    9.7   )-----------( 184      ( 01 Feb 2018 06:12 )             28.6     Urine Studies:        RADIOLOGY & ADDITIONAL STUDIES:

## 2018-02-01 NOTE — CHART NOTE - NSCHARTNOTEFT_GEN_A_CORE
Notified by RN in regards to a critical value.   Prothrombin Time and INR, Plasma in AM (02.01.18 @ 06:12)    Prothrombin Time, Plasma: 60.9: Effective March 21st, the reference range for PT has changed. sec    INR: 5.45: RECOMMENDED RANGES FOR THERAPEUTIC INR:    2.0-3.0 for most medical and surgical thromboembolic states    2.0-3.0 for atrial fibrillation    2.0-3.0 for bileaflet mechanical valve in aortic position    2.5-3.5 for mechanical heart valves   Chest 2004;126:[id number]  The presence of direct thrombin inhibitors (argatroban, refludan)  may falsely increase results. ratio Notified by RN in regards to a critical value.     Prothrombin Time and INR, Plasma in AM (02.01.18 @ 06:12)    Prothrombin Time, Plasma: 60.9: Effective March 21st, the reference range for PT has changed. sec    INR: 5.45: RECOMMENDED RANGES FOR THERAPEUTIC INR:    2.0-3.0 for most medical and surgical thromboembolic states    2.0-3.0 for atrial fibrillation    2.0-3.0 for bileaflet mechanical valve in aortic position    2.5-3.5 for mechanical heart valves   Chest 2004;126:[id number]  The presence of direct thrombin inhibitors (argatroban, refludan)  may falsely increase results. ratio    Patient with no signs of bleeding. Takes coumadin for atrial fibrillation and it has been held since his admission secondary to supra-therapeutic INR. Call placed to Dr. Alas. Aware of the result which could be secondary to malnutrition. Will continue to monitor, no intervention at this time.    Nguyen Johnson PA-C   [id number]

## 2018-02-01 NOTE — PHYSICAL THERAPY INITIAL EVALUATION ADULT - ADDITIONAL COMMENTS
1/30 Head CT: No evidence for intracranial hemorrhage, mass effect, or displaced calvarial fracture. Cervical spine CT: No evidence for acute displaced fracture or traumatic malalignment. Cervical degenerative spondylosis, as described above. 1/30 CT Pelvis: No acute fracture. Findings suggestive of a left iliopsoas muscle strain. 1/30 XR hip/pelvis: No acute fracture or dislocation. 1/30 XR chest: The heart is normal in size. The lungs are clear. Degenerative changes of the thoracic spine. Calcified aortic knob.

## 2018-02-01 NOTE — PROVIDER CONTACT NOTE (CRITICAL VALUE NOTIFICATION) - BACKGROUND
Pt admitted for syncope and collapse. Pt on warfarin for AF, last evening dose held for supratherapeutic INR of 4.48.

## 2018-02-01 NOTE — PHYSICAL THERAPY INITIAL EVALUATION ADULT - PERTINENT HX OF CURRENT PROBLEM, REHAB EVAL
88M pmhx htn, afib, chf, copd, here w/ L hip pain s/p fall while getting dressed this morning. Per pt he does not remember the incident very well but does not believe he lost consciousness or hit his head. He is on bloodthinners (plavix and coumadin) for his aFib. Pt denies precipitating sx including cp or dizziness prior to the fall. At baseline pt is sob requiring 3 duonebs per day for his COPD.

## 2018-02-01 NOTE — CONSULT NOTE ADULT - ASSESSMENT
88M pmhx htn, afib, chf, copd, here w/ L hip pain s/p fall while getting dressed. Patient renal function has worsened since admission

## 2018-02-02 LAB
-  COAGULASE NEGATIVE STAPHYLOCOCCUS: SIGNIFICANT CHANGE UP
ANION GAP SERPL CALC-SCNC: 15 MMOL/L — SIGNIFICANT CHANGE UP (ref 5–17)
ANION GAP SERPL CALC-SCNC: 18 MMOL/L — HIGH (ref 5–17)
APPEARANCE UR: ABNORMAL
BACTERIA # UR AUTO: NEGATIVE /HPF — SIGNIFICANT CHANGE UP
BILIRUB UR-MCNC: NEGATIVE — SIGNIFICANT CHANGE UP
BUN SERPL-MCNC: 86 MG/DL — HIGH (ref 7–23)
BUN SERPL-MCNC: 92 MG/DL — HIGH (ref 7–23)
CALCIUM SERPL-MCNC: 7.5 MG/DL — LOW (ref 8.4–10.5)
CALCIUM SERPL-MCNC: 8.2 MG/DL — LOW (ref 8.4–10.5)
CHLORIDE SERPL-SCNC: 100 MMOL/L — SIGNIFICANT CHANGE UP (ref 96–108)
CHLORIDE SERPL-SCNC: 101 MMOL/L — SIGNIFICANT CHANGE UP (ref 96–108)
CO2 SERPL-SCNC: 16 MMOL/L — LOW (ref 22–31)
CO2 SERPL-SCNC: 18 MMOL/L — LOW (ref 22–31)
COLOR SPEC: YELLOW — SIGNIFICANT CHANGE UP
CREAT ?TM UR-MCNC: 111 MG/DL — SIGNIFICANT CHANGE UP
CREAT SERPL-MCNC: 2.7 MG/DL — HIGH (ref 0.5–1.3)
CREAT SERPL-MCNC: 3.21 MG/DL — HIGH (ref 0.5–1.3)
CULTURE RESULTS: NO GROWTH — SIGNIFICANT CHANGE UP
DIFF PNL FLD: ABNORMAL
EPI CELLS # UR: SIGNIFICANT CHANGE UP /HPF
GLUCOSE SERPL-MCNC: 107 MG/DL — HIGH (ref 70–99)
GLUCOSE SERPL-MCNC: 142 MG/DL — HIGH (ref 70–99)
GLUCOSE UR QL: NEGATIVE — SIGNIFICANT CHANGE UP
GRAM STN FLD: SIGNIFICANT CHANGE UP
GRAM STN FLD: SIGNIFICANT CHANGE UP
HCT VFR BLD CALC: 25.4 % — LOW (ref 39–50)
HCT VFR BLD CALC: 25.6 % — LOW (ref 39–50)
HGB BLD-MCNC: 8.7 G/DL — LOW (ref 13–17)
HGB BLD-MCNC: 8.9 G/DL — LOW (ref 13–17)
HYALINE CASTS # UR AUTO: ABNORMAL
INR BLD: 4.46 RATIO — HIGH (ref 0.88–1.16)
KETONES UR-MCNC: NEGATIVE — SIGNIFICANT CHANGE UP
LEUKOCYTE ESTERASE UR-ACNC: NEGATIVE — SIGNIFICANT CHANGE UP
MCHC RBC-ENTMCNC: 29.9 PG — SIGNIFICANT CHANGE UP (ref 27–34)
MCHC RBC-ENTMCNC: 32.1 PG — SIGNIFICANT CHANGE UP (ref 27–34)
MCHC RBC-ENTMCNC: 34.4 GM/DL — SIGNIFICANT CHANGE UP (ref 32–36)
MCHC RBC-ENTMCNC: 34.8 GM/DL — SIGNIFICANT CHANGE UP (ref 32–36)
MCV RBC AUTO: 85.9 FL — SIGNIFICANT CHANGE UP (ref 80–100)
MCV RBC AUTO: 93.5 FL — SIGNIFICANT CHANGE UP (ref 80–100)
METHOD TYPE: SIGNIFICANT CHANGE UP
NITRITE UR-MCNC: NEGATIVE — SIGNIFICANT CHANGE UP
PH UR: 6 — SIGNIFICANT CHANGE UP (ref 5–8)
PLATELET # BLD AUTO: 176 K/UL — SIGNIFICANT CHANGE UP (ref 150–400)
PLATELET # BLD AUTO: 231 K/UL — SIGNIFICANT CHANGE UP (ref 150–400)
POTASSIUM SERPL-MCNC: 4.1 MMOL/L — SIGNIFICANT CHANGE UP (ref 3.5–5.3)
POTASSIUM SERPL-MCNC: 4.2 MMOL/L — SIGNIFICANT CHANGE UP (ref 3.5–5.3)
POTASSIUM SERPL-SCNC: 4.1 MMOL/L — SIGNIFICANT CHANGE UP (ref 3.5–5.3)
POTASSIUM SERPL-SCNC: 4.2 MMOL/L — SIGNIFICANT CHANGE UP (ref 3.5–5.3)
PROT UR-MCNC: 30 MG/DL
PROTHROM AB SERPL-ACNC: 52 SEC — HIGH (ref 10–13.1)
RBC # BLD: 2.71 M/UL — LOW (ref 4.2–5.8)
RBC # BLD: 2.98 M/UL — LOW (ref 4.2–5.8)
RBC # FLD: 14.1 % — SIGNIFICANT CHANGE UP (ref 10.3–14.5)
RBC # FLD: 15.3 % — HIGH (ref 10.3–14.5)
RBC CASTS # UR COMP ASSIST: NEGATIVE /HPF — SIGNIFICANT CHANGE UP (ref 0–2)
SODIUM SERPL-SCNC: 134 MMOL/L — LOW (ref 135–145)
SODIUM SERPL-SCNC: 134 MMOL/L — LOW (ref 135–145)
SODIUM UR-SCNC: <20 MMOL/L — SIGNIFICANT CHANGE UP
SP GR SPEC: 1.02 — SIGNIFICANT CHANGE UP (ref 1.01–1.02)
SPECIMEN SOURCE: SIGNIFICANT CHANGE UP
SPECIMEN SOURCE: SIGNIFICANT CHANGE UP
UROBILINOGEN FLD QL: NEGATIVE — SIGNIFICANT CHANGE UP
UUN UR-MCNC: 852 MG/DL — SIGNIFICANT CHANGE UP
WBC # BLD: 8.2 K/UL — SIGNIFICANT CHANGE UP (ref 3.8–10.5)
WBC # BLD: 8.33 K/UL — SIGNIFICANT CHANGE UP (ref 3.8–10.5)
WBC # FLD AUTO: 8.2 K/UL — SIGNIFICANT CHANGE UP (ref 3.8–10.5)
WBC # FLD AUTO: 8.33 K/UL — SIGNIFICANT CHANGE UP (ref 3.8–10.5)
WBC UR QL: SIGNIFICANT CHANGE UP /HPF (ref 0–5)

## 2018-02-02 PROCEDURE — 99222 1ST HOSP IP/OBS MODERATE 55: CPT | Mod: GC

## 2018-02-02 RX ORDER — VANCOMYCIN HCL 1 G
1000 VIAL (EA) INTRAVENOUS ONCE
Qty: 0 | Refills: 0 | Status: COMPLETED | OUTPATIENT
Start: 2018-02-02 | End: 2018-02-02

## 2018-02-02 RX ORDER — SODIUM CHLORIDE 9 MG/ML
1000 INJECTION INTRAMUSCULAR; INTRAVENOUS; SUBCUTANEOUS
Qty: 0 | Refills: 0 | Status: COMPLETED | OUTPATIENT
Start: 2018-02-02 | End: 2018-02-03

## 2018-02-02 RX ORDER — ACETAMINOPHEN 500 MG
650 TABLET ORAL ONCE
Qty: 0 | Refills: 0 | Status: COMPLETED | OUTPATIENT
Start: 2018-02-02 | End: 2018-02-02

## 2018-02-02 RX ORDER — SODIUM CHLORIDE 9 MG/ML
250 INJECTION INTRAMUSCULAR; INTRAVENOUS; SUBCUTANEOUS ONCE
Qty: 0 | Refills: 0 | Status: COMPLETED | OUTPATIENT
Start: 2018-02-02 | End: 2018-02-02

## 2018-02-02 RX ADMIN — ALBUTEROL 2 PUFF(S): 90 AEROSOL, METERED ORAL at 10:34

## 2018-02-02 RX ADMIN — Medication 650 MILLIGRAM(S): at 10:32

## 2018-02-02 RX ADMIN — CLOPIDOGREL BISULFATE 75 MILLIGRAM(S): 75 TABLET, FILM COATED ORAL at 10:33

## 2018-02-02 RX ADMIN — ALBUTEROL 2 PUFF(S): 90 AEROSOL, METERED ORAL at 05:37

## 2018-02-02 RX ADMIN — ALBUTEROL 2 PUFF(S): 90 AEROSOL, METERED ORAL at 00:00

## 2018-02-02 RX ADMIN — SODIUM CHLORIDE 40 MILLILITER(S): 9 INJECTION INTRAMUSCULAR; INTRAVENOUS; SUBCUTANEOUS at 15:14

## 2018-02-02 RX ADMIN — ATORVASTATIN CALCIUM 40 MILLIGRAM(S): 80 TABLET, FILM COATED ORAL at 22:04

## 2018-02-02 RX ADMIN — PANTOPRAZOLE SODIUM 40 MILLIGRAM(S): 20 TABLET, DELAYED RELEASE ORAL at 05:36

## 2018-02-02 RX ADMIN — Medication 100 MILLIGRAM(S): at 10:32

## 2018-02-02 RX ADMIN — TIOTROPIUM BROMIDE 1 CAPSULE(S): 18 CAPSULE ORAL; RESPIRATORY (INHALATION) at 10:35

## 2018-02-02 RX ADMIN — Medication 250 MILLIGRAM(S): at 06:49

## 2018-02-02 RX ADMIN — SODIUM CHLORIDE 500 MILLILITER(S): 9 INJECTION INTRAMUSCULAR; INTRAVENOUS; SUBCUTANEOUS at 14:27

## 2018-02-02 RX ADMIN — Medication 650 MILLIGRAM(S): at 11:15

## 2018-02-02 RX ADMIN — ALBUTEROL 2 PUFF(S): 90 AEROSOL, METERED ORAL at 22:09

## 2018-02-02 RX ADMIN — TAMSULOSIN HYDROCHLORIDE 0.4 MILLIGRAM(S): 0.4 CAPSULE ORAL at 22:04

## 2018-02-02 NOTE — CHART NOTE - NSCHARTNOTEFT_GEN_A_CORE
Blood cultures returned positive for gram positive cocci in aerobic bottle. Pt.'s Cr is currently 3.21. Discussed w/ Dr. Alas. As per Dr. Alas, give Vancomycin 1 gram x 1. Continue to monitor pt. throughout the night. F/u w/ primary team in AM.    -Kirsty Moore PA-C. #[id number].

## 2018-02-02 NOTE — CONSULT NOTE ADULT - SUBJECTIVE AND OBJECTIVE BOX
HPI:  88M pmhx htn, afib, chf, copd, here w/ L hip pain s/p fall while getting dressed this morning. Per pt he does not remember the incident very well but does not believe he lost consciousness or hit his head. He is on blood thinners (plavix and coumadin) for his aFib. Pt denies precipitating sx including cp or dizziness prior to the fall. At baseline pt is sob requiring 3 duonebs per day for his COPD. He is complaining of his usual sob today as well. Pt also denies recent f/c, ap/n/v/d, headaches, balance issues, or any other associated symptoms (2018 16:41)      PAST MEDICAL & SURGICAL HISTORY:  Afib  HTN (hypertension)  CHF (congestive heart failure)  COPD (chronic obstructive pulmonary disease)  PAD (peripheral artery disease)  Hypercholesterolemia  Atrial fibrillation  Hypertension  No significant past surgical history  Abnormal coronary angiogram: 3 different times had a total of 5 stents placed  S/P aortic valve repair      Allergies  Apresoline (Unknown)  penicillin (Unknown)        ANTIMICROBIALS:      OTHER MEDS: MEDICATIONS  (STANDING):  ALBUTerol    90 MICROgram(s) HFA Inhaler 2 every 6 hours  allopurinol 100 daily  atorvastatin 40 at bedtime  clopidogrel Tablet 75 daily  docusate sodium 100 three times a day  mesalamine DR Capsule 800 <User Schedule>  pantoprazole    Tablet 40 before breakfast  tamsulosin 0.4 at bedtime  tiotropium 18 MICROgram(s) Capsule 1 daily      SOCIAL HISTORY:  [ ] etoh [ ] tobacco [ ] former smoker [ ] IVDU    FAMILY HISTORY:  No pertinent family history in first degree relatives  Family history of cerebrovascular accident      REVIEW OF SYSTEMS  [  ] ROS unobtainable because:    [  ] All other systems negative except as noted below:	    Constitutional:  [ ] fever [ ] weight loss  Skin:  [ ] rash [ ] phlebitis	  Eyes: [ ] icterus [ ] inflammation	  ENMT: [ ] discharge [ ] thrush [ ] ulcers [ ] exudates  Respiratory: [ ] dyspnea [ ] hemoptysis [ ] cough [ ] sputum	  Cardiovascular:  [ ] chest pain [ ] palpitations [ ] edema	  Gastrointestinal:  [ ] nausea [ ] vomiting [ ] diarrhea [ ] constipation [ ] pain	  Genitourinary:  [ ] dysuria [ ] frequency [ ] hematuria [ ] discharge [ ] flank pain  Musculoskeletal:  [ ] myalgias [ ] arthralgias [ ] arthritis	  Neurological:  [ ] headache [ ] seizures	  Psychiatric:  [ ] anxiety [ ] depression	  Hematology/Lymphatics:  [ ] lymphadenopathy  Endocrine:  [ ] adrenal [ ] thyroid  Allergic/Immunologic:	 [ ] transplant [ ] seasonal    Vital Signs Last 24 Hrs  T(F): 98.1 (18 @ 11:39), Max: 99.4 (18 @ 09:54)    Vital Signs Last 24 Hrs  HR: 86 (18 @ 11:39) (76 - 93)  BP: 109/67 (18 @ 11:39) (82/44 - 111/61)  RR: 18 (18 @ 11:39)  SpO2: 95% (18 @ 11:39) (95% - 99%)  Wt(kg): --    PHYSICAL EXAM:  General: non-toxic  HEAD/EYES: anicteric, PERRL  ENT:  supple  Cardiovascular:   S1, S2  Respiratory:  clear bilaterally  GI:  soft, non-tender, normal bowel sounds  :  no CVA tenderness   Musculoskeletal:  no synovitis  Neurologic:  grossly non-focal  Skin:  no rash  Lymph: no lymphadenopathy  Psychiatric:  appropriate affect  Vascular:  no phlebitis                                8.9    8.33  )-----------( 231      ( 2018 07:32 )             25.6       02-    134<L>  |  101  |  92<H>  ----------------------------<  107<H>  4.1   |  18<L>  |  2.70<H>    Ca    7.5<L>      2018 08:29    TPro  6.2  /  Alb  2.9<L>  /  TBili  1.2  /  DBili  x   /  AST  34  /  ALT  12  /  AlkPhos  119        Urinalysis Basic - ( 2018 00:05 )    Color: Yellow / Appearance: SL Turbid / S.016 / pH: x  Gluc: x / Ketone: Negative  / Bili: Negative / Urobili: Negative   Blood: x / Protein: 30 mg/dL / Nitrite: Negative   Leuk Esterase: Negative / RBC: Negative /HPF / WBC 0-2 /HPF   Sq Epi: x / Non Sq Epi: OCC /HPF / Bacteria: Negative /HPF        MICROBIOLOGY:  .Blood Blood  02-01-18   Growth in aerobic bottle: Gram Positive Cocci in Clusters  ***Blood Panel PCR results on this specimen are available  approximately 3 hours after the Gram stain result.***  Gram stain, PCR, and/or culture results may not always  correspond due to difference in methodologies.  ************************************************************  This PCR assay was performed using [employer].  The following targets are tested for: Enterococcus,  vancomycin resistant enterococci, Listeria monocytogenes,  coagulase negative staphylococci, S. aureus,  methicillin resistant S. aureus, Streptococcus agalactiae  (Group B), S. pneumoniae, S. pyogenes (Group A),  Acinetobacter baumannii, Enterobacter cloacae, E. coli,  Klebsiella oxytoca, K. pneumoniae, Proteus sp.,  Serratia marcescens, Haemophilus influenzae,  Neisseria meningitidis, Pseudomonas aeruginosa, Candida  albicans, C. glabrata, C krusei, C parapsilosis,  C. tropicalis and the KPC resistance gene.  --  Blood Culture PCR              v    Rapid RVP Result: NotDetec ( @ 11:52)          RADIOLOGY:  < from: Xray Ankle Complete 3 Views, Right (18 @ 16:38) >  FINDINGS:     There is no acute fracture. Anatomic alignment is maintained. The   visualized joint spaces are preserved. There are vascular calcifications.      IMPRESSION:     No acute fracture or dislocation.     < end of copied text >    < from: Xray Hip w/ Pelvis 2 or 3 Views, Left (18 @ 13:27) >  FINDINGS:     There is no acute fracture. Anatomic alignment is maintained. The   bilateral hip joint spaces are preserved. There are degenerative changes   of the lower lumbar spine. There are bilateral lower extremity vascular   calcifications.      IMPRESSION:     No acute fracture or dislocation.     < end of copied text >    < from: Xray Hip w/ Pelvis 2 or 3 Views, Left (18 @ 13:27) >  FINDINGS:     There is no acute fracture. Anatomic alignment is maintained. The   bilateral hip joint spaces are preserved. There are degenerative changes   of the lower lumbar spine. There are bilateral lower extremity vascular   calcifications.      IMPRESSION:     No acute fracture or dislocation.     < end of copied text >    < from: Xray Chest 1 View AP/PA (18 @ 13:26) >  Impression:    The heart is normal in size.The lungs are clear. Degenerative changes of   the thoracic spine. Calcified aortic knob.    < end of copied text >    < from: CT 3D Reconstruct w/ Workstation (18 @ 13:26) >  Findings:    There is no acute fracture. There is mild enlargement of the left psoas   for example on series 4 image 61 with minimal adjacent fat stranding   suggestive of a muscle strain.    There is a trace retrolisthesis of L4 on L5 with ossification in the   right foraminal region resulting in severe right foraminal narrowing.   There is chondrocalcinosis of both hip joints with minimal arthrosis.     Arterial calcifications are noted.     Impression: No acute fracture. Findings suggestive of a left iliopsoas   muscle strain.    < end of copied text >    < from: CT Head No Cont (18 @ 13:26) >  IMPRESSION:    Head CT: No evidence for intracranial hemorrhage, mass effect, or   displaced calvarial fracture.    Cervical spine CT: No evidence for acute displaced fracture or traumatic   malalignment. Cervical degenerative spondylosis, as described above.     < end of copied text > HPI:  88 M PMH HTN, AFIB, CHF, COPD, here w/ L hip pain s/p fall while getting dressed this morning. Per pt he does not remember the incident very well but does not believe he lost consciousness or hit his head. He is on blood thinners (plavix and coumadin) for his AFIB. Pt denies precipitating sx including cp or dizziness prior to the fall. At baseline pt is sob requiring 3 duonebs per day for his COPD. He is complaining of his usual sob today as well. Pt also denies recent f/c, ap/n/v/d, headaches, balance issues, or any other associated symptoms (2018 16:41)  ID note-above reviewed. Patient does not clearly remember what brought him to the hospital. Per daughter at bedside, the day before admission he had been complaining of "not feeling well". The next day he was found on the floor by the . He denies any recent fever or illness, Has been having productive cough with white sputum which he always has. Per daughter she feels he has been more congested lately. Patient thinks he hurt his left hip when he fell, initially had pain there which has resolved and since being in the hospital he has developed right ankle pain. No recent falls or injuries. Has been having mild left eye discharge.        PAST MEDICAL & SURGICAL HISTORY:  Afib  HTN (hypertension)  CHF (congestive heart failure)  COPD (chronic obstructive pulmonary disease)  PAD (peripheral artery disease)  Hypercholesterolemia  Atrial fibrillation  Hypertension  s/p TAVR  Abnormal coronary angiogram: 3 different times had a total of 5 stents placed  S/P aortic valve repair      Allergies  Apresoline (Unknown)  penicillin (Unknown)        ANTIMICROBIALS:      OTHER MEDS: MEDICATIONS  (STANDING):  ALBUTerol    90 MICROgram(s) HFA Inhaler 2 every 6 hours  allopurinol 100 daily  atorvastatin 40 at bedtime  clopidogrel Tablet 75 daily  docusate sodium 100 three times a day  mesalamine DR Capsule 800 <User Schedule>  pantoprazole    Tablet 40 before breakfast  tamsulosin 0.4 at bedtime  tiotropium 18 MICROgram(s) Capsule 1 daily      SOCIAL HISTORY:  [ ] etoh [ ] tobacco [x ] former smoker [ ] IVDU    FAMILY HISTORY:  No pertinent family history in first degree relatives  Family history of cerebrovascular accident      REVIEW OF SYSTEMS  [  ] ROS unobtainable because:    [ x ] All other systems negative except as noted below:	    Constitutional:  [ ] fever [ ] weight loss  Skin:  [ ] rash [ ] phlebitis	  Eyes: [ ] icterus [ ] inflammation	  ENMT: [ ] discharge [ ] thrush [ ] ulcers [ ] exudates  Respiratory: [ ] dyspnea [ ] hemoptysis [x ] cough [ x] sputum	  Cardiovascular:  [ ] chest pain [ ] palpitations [ ] edema	  Gastrointestinal:  [ ] nausea [ ] vomiting [ ] diarrhea [ ] constipation [ ] pain	  Genitourinary:  [ ] dysuria [ ] frequency [ ] hematuria [ ] discharge [ ] flank pain  Musculoskeletal:  [ ] myalgias [ ] arthralgias [ ] arthritis	  Neurological:  [ ] headache [ ] seizures	  Psychiatric:  [ ] anxiety [ ] depression	  Hematology/Lymphatics:  [ ] lymphadenopathy  Endocrine:  [ ] adrenal [ ] thyroid  Allergic/Immunologic:	 [ ] transplant [ ] seasonal    Vital Signs Last 24 Hrs  T(F): 98.1 (18 @ 11:39), Max: 99.4 (18 @ 09:54)    Vital Signs Last 24 Hrs  HR: 86 (18 @ 11:39) (76 - 93)  BP: 109/67 (18 @ 11:39) (82/44 - 111/61)  RR: 18 (18 @ 11:39)  SpO2: 95% (18 @ 11:39) (95% - 99%)  Wt(kg): --    PHYSICAL EXAM:  General: non-toxic lying in bed oxygen via nasal canula  HEAD/EYES: anicteric, PERRL  ENT:  supple  Cardiovascular:   S1, S2 + DIANNE  Respiratory:  clear bilaterally  GI:  soft, non-tender, normal bowel sounds  :  no CVA tenderness scrotal redness  Musculoskeletal:  right ankle tender  Neurologic:  grossly non-focal  Skin:  no rash  Lymph: no lymphadenopathy  Psychiatric:  appropriate affect  Vascular:  no phlebitis                                8.9    8.33  )-----------( 231      ( 2018 07:32 )             25.6       -    134<L>  |  101  |  92<H>  ----------------------------<  107<H>  4.1   |  18<L>  |  2.70<H>    Ca    7.5<L>      2018 08:29    TPro  6.2  /  Alb  2.9<L>  /  TBili  1.2  /  DBili  x   /  AST  34  /  ALT  12  /  AlkPhos  119        Urinalysis Basic - ( 2018 00:05 )    Color: Yellow / Appearance: SL Turbid / S.016 / pH: x  Gluc: x / Ketone: Negative  / Bili: Negative / Urobili: Negative   Blood: x / Protein: 30 mg/dL / Nitrite: Negative   Leuk Esterase: Negative / RBC: Negative /HPF / WBC 0-2 /HPF   Sq Epi: x / Non Sq Epi: OCC /HPF / Bacteria: Negative /HPF        MICROBIOLOGY:  .Blood Blood  18   Growth in aerobic bottle: Gram Positive Cocci in Clusters  ***Blood Panel PCR results on this specimen are available  approximately 3 hours after the Gram stain result.***  Gram stain, PCR, and/or culture results may not always  correspond due to difference in methodologies.  ************************************************************  This PCR assay was performed using [employer].  The following targets are tested for: Enterococcus,  vancomycin resistant enterococci, Listeria monocytogenes,  coagulase negative staphylococci, S. aureus,  methicillin resistant S. aureus, Streptococcus agalactiae  (Group B), S. pneumoniae, S. pyogenes (Group A),  Acinetobacter baumannii, Enterobacter cloacae, E. coli,  Klebsiella oxytoca, K. pneumoniae, Proteus sp.,  Serratia marcescens, Haemophilus influenzae,  Neisseria meningitidis, Pseudomonas aeruginosa, Candida  albicans, C. glabrata, C krusei, C parapsilosis,  C. tropicalis and the KPC resistance gene.  --  Blood Culture PCR              v    Rapid RVP Result: NotDetec ( @ 11:52)          RADIOLOGY:  < from: Xray Ankle Complete 3 Views, Right (18 @ 16:38) >  FINDINGS:     There is no acute fracture. Anatomic alignment is maintained. The   visualized joint spaces are preserved. There are vascular calcifications.      IMPRESSION:     No acute fracture or dislocation.     < end of copied text >    < from: Xray Hip w/ Pelvis 2 or 3 Views, Left (18 @ 13:27) >  FINDINGS:     There is no acute fracture. Anatomic alignment is maintained. The   bilateral hip joint spaces are preserved. There are degenerative changes   of the lower lumbar spine. There are bilateral lower extremity vascular   calcifications.      IMPRESSION:     No acute fracture or dislocation.     < end of copied text >    < from: Xray Hip w/ Pelvis 2 or 3 Views, Left (18 @ 13:27) >  FINDINGS:     There is no acute fracture. Anatomic alignment is maintained. The   bilateral hip joint spaces are preserved. There are degenerative changes   of the lower lumbar spine. There are bilateral lower extremity vascular   calcifications.      IMPRESSION:     No acute fracture or dislocation.     < end of copied text >    < from: Xray Chest 1 View AP/PA (18 @ 13:26) >  Impression:    The heart is normal in size.The lungs are clear. Degenerative changes of   the thoracic spine. Calcified aortic knob.    < end of copied text >    < from: CT 3D Reconstruct w/ Workstation (18 @ 13:26) >  Findings:    There is no acute fracture. There is mild enlargement of the left psoas   for example on series 4 image 61 with minimal adjacent fat stranding   suggestive of a muscle strain.    There is a trace retrolisthesis of L4 on L5 with ossification in the   right foraminal region resulting in severe right foraminal narrowing.   There is chondrocalcinosis of both hip joints with minimal arthrosis.     Arterial calcifications are noted.     Impression: No acute fracture. Findings suggestive of a left iliopsoas   muscle strain.    < end of copied text >    < from: CT Head No Cont (18 @ 13:26) >  IMPRESSION:    Head CT: No evidence for intracranial hemorrhage, mass effect, or   displaced calvarial fracture.    Cervical spine CT: No evidence for acute displaced fracture or traumatic   malalignment. Cervical degenerative spondylosis, as described above.     < end of copied text > HPI:  88 M PMH HTN, AFIB, CHF, COPD, here w/ L hip pain s/p fall while getting dressed this morning. Per pt he does not remember the incident very well but does not believe he lost consciousness or hit his head. He is on blood thinners (plavix and coumadin) for his AFIB. Pt denies precipitating sx including cp or dizziness prior to the fall. At baseline pt is sob requiring 3 duonebs per day for his COPD. He is complaining of his usual sob today as well. Pt also denies recent f/c, ap/n/v/d, headaches, balance issues, or any other associated symptoms (2018 16:41)  ID note-above reviewed. Patient does not clearly remember what brought him to the hospital. Per daughter at bedside, the day before admission he had been complaining of "not feeling well". The next day he was found on the floor by the . He denies any recent fever or illness, Has been having productive cough with white sputum which he always has. Per daughter she feels he has been more congested lately. Recently he has noted some "spots of blood" and are due to see a pulmonologist. Patient thinks he hurt his left hip when he fell, initially had pain there which has resolved and since being in the hospital he has developed right ankle pain. No recent falls or injuries. Has been having mild left eye discharge.        PAST MEDICAL & SURGICAL HISTORY:  Afib  HTN (hypertension)  CHF (congestive heart failure)  COPD (chronic obstructive pulmonary disease)  PAD (peripheral artery disease)  Hypercholesterolemia  Atrial fibrillation  Hypertension  s/p TAVR  Abnormal coronary angiogram: 3 different times had a total of 5 stents placed  S/P aortic valve repair      Allergies  Apresoline (Unknown)  penicillin (Unknown)        ANTIMICROBIALS:      OTHER MEDS: MEDICATIONS  (STANDING):  ALBUTerol    90 MICROgram(s) HFA Inhaler 2 every 6 hours  allopurinol 100 daily  atorvastatin 40 at bedtime  clopidogrel Tablet 75 daily  docusate sodium 100 three times a day  mesalamine DR Capsule 800 <User Schedule>  pantoprazole    Tablet 40 before breakfast  tamsulosin 0.4 at bedtime  tiotropium 18 MICROgram(s) Capsule 1 daily      SOCIAL HISTORY:  [ ] etoh [ ] tobacco [x ] former smoker [ ] IVDU    FAMILY HISTORY:  No pertinent family history in first degree relatives  Family history of cerebrovascular accident      REVIEW OF SYSTEMS  [  ] ROS unobtainable because:    [ x ] All other systems negative except as noted below:	    Constitutional:  [ ] fever [ ] weight loss  Skin:  [ ] rash [ ] phlebitis	  Eyes: [ ] icterus [ ] inflammation	  ENMT: [ ] discharge [ ] thrush [ ] ulcers [ ] exudates  Respiratory: [ ] dyspnea [ ] hemoptysis [x ] cough [ x] sputum	  Cardiovascular:  [ ] chest pain [ ] palpitations [ ] edema	  Gastrointestinal:  [ ] nausea [ ] vomiting [ ] diarrhea [ ] constipation [ ] pain	  Genitourinary:  [ ] dysuria [ ] frequency [ ] hematuria [ ] discharge [ ] flank pain  Musculoskeletal:  [ ] myalgias [ ] arthralgias [ ] arthritis	  Neurological:  [ ] headache [ ] seizures	  Psychiatric:  [ ] anxiety [ ] depression	  Hematology/Lymphatics:  [ ] lymphadenopathy  Endocrine:  [ ] adrenal [ ] thyroid  Allergic/Immunologic:	 [ ] transplant [ ] seasonal    Vital Signs Last 24 Hrs  T(F): 98.1 (18 @ 11:39), Max: 99.4 (18 @ 09:54)    Vital Signs Last 24 Hrs  HR: 86 (18 @ 11:39) (76 - 93)  BP: 109/67 (18 @ 11:39) (82/44 - 111/61)  RR: 18 (18 @ 11:39)  SpO2: 95% (18 @ 11:39) (95% - 99%)  Wt(kg): --    PHYSICAL EXAM:  General: non-toxic lying in bed oxygen via nasal canula  HEAD/EYES: anicteric, PERRL  ENT:  supple  Cardiovascular:   S1, S2 + DIANNE  Respiratory:  clear bilaterally  GI:  soft, non-tender, normal bowel sounds  :  no CVA tenderness scrotal redness  Musculoskeletal:  right ankle tender  Neurologic:  grossly non-focal  Skin:  no rash  Lymph: no lymphadenopathy  Psychiatric:  appropriate affect  Vascular:  no phlebitis                                8.9    8.33  )-----------( 231      ( 2018 07:32 )             25.6           134<L>  |  101  |  92<H>  ----------------------------<  107<H>  4.1   |  18<L>  |  2.70<H>    Ca    7.5<L>      2018 08:29    TPro  6.2  /  Alb  2.9<L>  /  TBili  1.2  /  DBili  x   /  AST  34  /  ALT  12  /  AlkPhos  119        Urinalysis Basic - ( 2018 00:05 )    Color: Yellow / Appearance: SL Turbid / S.016 / pH: x  Gluc: x / Ketone: Negative  / Bili: Negative / Urobili: Negative   Blood: x / Protein: 30 mg/dL / Nitrite: Negative   Leuk Esterase: Negative / RBC: Negative /HPF / WBC 0-2 /HPF   Sq Epi: x / Non Sq Epi: OCC /HPF / Bacteria: Negative /HPF        MICROBIOLOGY:  .Blood Blood  18   Growth in aerobic bottle: Gram Positive Cocci in Clusters  ***Blood Panel PCR results on this specimen are available  approximately 3 hours after the Gram stain result.***  Gram stain, PCR, and/or culture results may not always  correspond due to difference in methodologies.  ************************************************************  This PCR assay was performed using [employer].  The following targets are tested for: Enterococcus,  vancomycin resistant enterococci, Listeria monocytogenes,  coagulase negative staphylococci, S. aureus,  methicillin resistant S. aureus, Streptococcus agalactiae  (Group B), S. pneumoniae, S. pyogenes (Group A),  Acinetobacter baumannii, Enterobacter cloacae, E. coli,  Klebsiella oxytoca, K. pneumoniae, Proteus sp.,  Serratia marcescens, Haemophilus influenzae,  Neisseria meningitidis, Pseudomonas aeruginosa, Candida  albicans, C. glabrata, C krusei, C parapsilosis,  C. tropicalis and the KPC resistance gene.  --  Blood Culture PCR              v    Rapid RVP Result: NotDetec ( @ 11:52)          RADIOLOGY:  < from: Xray Ankle Complete 3 Views, Right (18 @ 16:38) >  FINDINGS:     There is no acute fracture. Anatomic alignment is maintained. The   visualized joint spaces are preserved. There are vascular calcifications.      IMPRESSION:     No acute fracture or dislocation.     < end of copied text >    < from: Xray Hip w/ Pelvis 2 or 3 Views, Left (18 @ 13:27) >  FINDINGS:     There is no acute fracture. Anatomic alignment is maintained. The   bilateral hip joint spaces are preserved. There are degenerative changes   of the lower lumbar spine. There are bilateral lower extremity vascular   calcifications.      IMPRESSION:     No acute fracture or dislocation.     < end of copied text >    < from: Xray Hip w/ Pelvis 2 or 3 Views, Left (18 @ 13:27) >  FINDINGS:     There is no acute fracture. Anatomic alignment is maintained. The   bilateral hip joint spaces are preserved. There are degenerative changes   of the lower lumbar spine. There are bilateral lower extremity vascular   calcifications.      IMPRESSION:     No acute fracture or dislocation.     < end of copied text >    < from: Xray Chest 1 View AP/PA (18 @ 13:26) >  Impression:    The heart is normal in size.The lungs are clear. Degenerative changes of   the thoracic spine. Calcified aortic knob.    < end of copied text >    < from: CT 3D Reconstruct w/ Workstation (18 @ :26) >  Findings:    There is no acute fracture. There is mild enlargement of the left psoas   for example on series 4 image 61 with minimal adjacent fat stranding   suggestive of a muscle strain.    There is a trace retrolisthesis of L4 on L5 with ossification in the   right foraminal region resulting in severe right foraminal narrowing.   There is chondrocalcinosis of both hip joints with minimal arthrosis.     Arterial calcifications are noted.     Impression: No acute fracture. Findings suggestive of a left iliopsoas   muscle strain.    < end of copied text >    < from: CT Head No Cont (18 @ 13:26) >  IMPRESSION:    Head CT: No evidence for intracranial hemorrhage, mass effect, or   displaced calvarial fracture.    Cervical spine CT: No evidence for acute displaced fracture or traumatic   malalignment. Cervical degenerative spondylosis, as described above.     < end of copied text > HPI:  88 M PMH HTN, AFIB, CHF, COPD, here w/ L hip pain s/p fall while getting dressed this morning. Per pt he does not remember the incident very well but does not believe he lost consciousness or hit his head. He is on blood thinners (plavix and coumadin) for his AFIB. Pt denies precipitating sx including cp or dizziness prior to the fall. At baseline pt is sob requiring 3 duonebs per day for his COPD. He is complaining of his usual sob today as well. Pt also denies recent f/c, ap/n/v/d, headaches, balance issues, or any other associated symptoms (2018 16:41)  ID note-above reviewed. Patient does not clearly remember what brought him to the hospital. Per daughter at bedside, the day before admission he had been complaining of "not feeling well". The next day he was found on the floor by the . He denies any recent fever or illness, Has been having productive cough with white sputum which he always has. Per daughter she feels he has been more congested lately. Recently he has noted some "spots of blood" and are due to see a pulmonologist. Patient thinks he hurt his left hip when he fell, initially had pain there which has resolved and since being in the hospital he has developed right ankle pain. No recent falls or injuries. Has been having mild left eye discharge.        PAST MEDICAL & SURGICAL HISTORY:  Afib  HTN (hypertension)  CHF (congestive heart failure)  COPD (chronic obstructive pulmonary disease)  PAD (peripheral artery disease)  Hypercholesterolemia  Atrial fibrillation  Hypertension  s/p TAVR  Abnormal coronary angiogram: 3 different times had a total of 5 stents placed  S/P aortic valve repair      Allergies  Apresoline (Unknown)  penicillin (Unknown)        ANTIMICROBIALS:      OTHER MEDS: MEDICATIONS  (STANDING):  ALBUTerol    90 MICROgram(s) HFA Inhaler 2 every 6 hours  allopurinol 100 daily  atorvastatin 40 at bedtime  clopidogrel Tablet 75 daily  docusate sodium 100 three times a day  mesalamine DR Capsule 800 <User Schedule>  pantoprazole    Tablet 40 before breakfast  tamsulosin 0.4 at bedtime  tiotropium 18 MICROgram(s) Capsule 1 daily      SOCIAL HISTORY:  [ ] etoh [ ] tobacco [x ] former smoker [ ] IVDU    FAMILY HISTORY:  No pertinent family history in first degree relatives  Family history of cerebrovascular accident      REVIEW OF SYSTEMS  [  ] ROS unobtainable because:    [ x ] All other systems negative except as noted below:	    Constitutional:  [ ] fever [ ] weight loss  Skin:  [ ] rash [ ] phlebitis	  Eyes: [ ] icterus [ ] inflammation	  ENMT: [ ] discharge [ ] thrush [ ] ulcers [ ] exudates  Respiratory: [ ] dyspnea [ ] hemoptysis [x ] cough [ x] sputum	  Cardiovascular:  [ ] chest pain [ ] palpitations [ ] edema	  Gastrointestinal:  [ ] nausea [ ] vomiting [ ] diarrhea [ ] constipation [ ] pain	  Genitourinary:  [ ] dysuria [ ] frequency [ ] hematuria [ ] discharge [ ] flank pain  Musculoskeletal:  [ ] myalgias [ ] arthralgias [ ] arthritis	  Neurological:  [ ] headache [ ] seizures	  Psychiatric:  [ ] anxiety [ ] depression	  Hematology/Lymphatics:  [ ] lymphadenopathy  Endocrine:  [ ] adrenal [ ] thyroid  Allergic/Immunologic:	 [ ] transplant [ ] seasonal    Vital Signs Last 24 Hrs  T(F): 98.1 (18 @ 11:39), Max: 99.4 (18 @ 09:54)    Vital Signs Last 24 Hrs  HR: 86 (18 @ 11:39) (76 - 93)  BP: 109/67 (18 @ 11:39) (82/44 - 111/61)  RR: 18 (18 @ 11:39)  SpO2: 95% (18 @ 11:39) (95% - 99%)  Wt(kg): --    PHYSICAL EXAM:  General: non-toxic lying in bed oxygen via nasal canula  HEAD/EYES: anicteric, PERRL  ENT:  supple  Cardiovascular:   S1, S2 + DIANNE  Respiratory:  clear bilaterally  GI:  soft, non-tender, normal bowel sounds  :  no CVA tenderness scrotal redness  Musculoskeletal:  right ankle tender  Neurologic:  grossly non-focal  Skin:  no rash  Lymph: no lymphadenopathy  Psychiatric:  appropriate affect  Vascular:  no phlebitis                                8.9    8.33  )-----------( 231      ( 2018 07:32 )             25.6           134<L>  |  101  |  92<H>  ----------------------------<  107<H>  4.1   |  18<L>  |  2.70<H>    Ca    7.5<L>      2018 08:29    TPro  6.2  /  Alb  2.9<L>  /  TBili  1.2  /  DBili  x   /  AST  34  /  ALT  12  /  AlkPhos  119        Urinalysis Basic - ( 2018 00:05 )    Color: Yellow / Appearance: SL Turbid / S.016 / pH: x  Gluc: x / Ketone: Negative  / Bili: Negative / Urobili: Negative   Blood: x / Protein: 30 mg/dL / Nitrite: Negative   Leuk Esterase: Negative / RBC: Negative /HPF / WBC 0-2 /HPF   Sq Epi: x / Non Sq Epi: OCC /HPF / Bacteria: Negative /HPF        MICROBIOLOGY:  .Blood Blood  18   Growth in aerobic bottle: Gram Positive Cocci in Clusters  ***Blood Panel PCR results on this specimen are available  approximately 3 hours after the Gram stain result.***  Gram stain, PCR, and/or culture results may not always  correspond due to difference in methodologies.  ************************************************************  This PCR assay was performed using [employer].  The following targets are tested for: Enterococcus,  vancomycin resistant enterococci, Listeria monocytogenes,  coagulase negative staphylococci, S. aureus,  methicillin resistant S. aureus, Streptococcus agalactiae  (Group B), S. pneumoniae, S. pyogenes (Group A),  Acinetobacter baumannii, Enterobacter cloacae, E. coli,  Klebsiella oxytoca, K. pneumoniae, Proteus sp.,  Serratia marcescens, Haemophilus influenzae,  Neisseria meningitidis, Pseudomonas aeruginosa, Candida  albicans, C. glabrata, C krusei, C parapsilosis,  C. tropicalis and the KPC resistance gene.  --  Blood Culture PCR              v    Rapid RVP Result: NotDetec ( @ 11:52)          RADIOLOGY:  < from: Xray Ankle Complete 3 Views, Right (18 @ 16:38) >  FINDINGS:     There is no acute fracture. Anatomic alignment is maintained. The   visualized joint spaces are preserved. There are vascular calcifications.      IMPRESSION:     No acute fracture or dislocation.     < end of copied text >    < from: Xray Hip w/ Pelvis 2 or 3 Views, Left (18 @ 13:27) >  FINDINGS:     There is no acute fracture. Anatomic alignment is maintained. The   bilateral hip joint spaces are preserved. There are degenerative changes   of the lower lumbar spine. There are bilateral lower extremity vascular   calcifications.      IMPRESSION:     No acute fracture or dislocation.     < end of copied text >    < from: Xray Hip w/ Pelvis 2 or 3 Views, Left (18 @ 13:27) >  FINDINGS:     There is no acute fracture. Anatomic alignment is maintained. The   bilateral hip joint spaces are preserved. There are degenerative changes   of the lower lumbar spine. There are bilateral lower extremity vascular   calcifications.      IMPRESSION:     No acute fracture or dislocation.     < end of copied text >    < from: Xray Chest 1 View AP/PA (18 @ 13:26) >  Impression:    The heart is normal in size.The lungs are clear. Degenerative changes of   the thoracic spine. Calcified aortic knob.    < end of copied text >    < from: CT 3D Reconstruct w/ Workstation (18 @ :26) >  Findings:    There is no acute fracture. There is mild enlargement of the left psoas   for example on series 4 image 61 with minimal adjacent fat stranding   suggestive of a muscle strain.    There is a trace retrolisthesis of L4 on L5 with ossification in the   right foraminal region resulting in severe right foraminal narrowing.   There is chondrocalcinosis of both hip joints with minimal arthrosis.     Arterial calcifications are noted.     Impression: No acute fracture. Findings suggestive of a left iliopsoas   muscle strain.    < end of copied text >    < from: CT Head No Cont (18 @ :26) >  FINDINGS:    HEAD:    There is no CT evidence of acute intracranial hemorrhage, extra-axial   collection, vasogenic edema, mass effect, midline shift, central   herniation, hydrocephalus or acute territorial infarct.     There is age-appropriate cerebral volume loss. There is moderate patchy   white matter hypoattenuation which is nonspecific in etiology but likely   related to chronic microvascular ischemic disease. 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    Mild mucosal thickening of the bilateral maxillary, frontal, and ethmoid   sinuses. Small mucoid polyp/retention cyst in the left sphenoid sinus.    The mastoid air cells and middle ear cavities are grossly clear.    The calvarium is intact.    CERVICAL SPINE:    There is no evidence for acute fracture, subluxation, or prevertebral   widening. The craniocervical junction is unremarkable.    There is mild degenerative disease with loss of intervertebral disc   space, most severe at C5-C6, and vertebral body height. There is mild   neuroforaminal narrowing. There is no central canal narrowing. The   intraspinal contents are grossly unremarkable.    The lung apices are clear. The surrounding soft tissues are unremarkable.    IMPRESSION:    Head CT: No evidence for intracranial hemorrhage, mass effect, or   displaced calvarial fracture.    Cervical spine CT: No evidence for acute displaced fracture or traumatic   malalignment. Cervical degenerative spondylosis, as described above.     < end of copied text > HPI:  88 M PMH HTN, AFIB, CHF, COPD, CKD here w/ L hip pain s/p fall while getting dressed this morning. Per pt he does not remember the incident very well but does not believe he lost consciousness or hit his head. He is on blood thinners (plavix and coumadin) for his AFIB. Pt denies precipitating sx including cp or dizziness prior to the fall. At baseline pt is sob requiring 3 duonebs per day for his COPD. He is complaining of his usual sob today as well. Pt also denies recent f/c, ap/n/v/d, headaches, balance issues, or any other associated symptoms (2018 16:41)  ID note-above reviewed. Patient does not clearly remember what brought him to the hospital. Per daughter at bedside, the day before admission he had been complaining of "not feeling well". The next day he was found on the floor by the . He denies any recent fever or illness, Has been having productive cough with white sputum which he always has. Per daughter she feels he has been more congested lately. Recently he has noted some "spots of blood" and are due to see a pulmonologist. Patient thinks he hurt his left hip when he fell, initially had pain there which has resolved and since being in the hospital he has developed right ankle pain. No recent falls or injuries. Has been having mild left eye discharge.        PAST MEDICAL & SURGICAL HISTORY:  Afib  HTN (hypertension)  CHF (congestive heart failure)  COPD (chronic obstructive pulmonary disease)  PAD (peripheral artery disease)  Hypercholesterolemia  Atrial fibrillation  Hypertension  s/p TAVR  Abnormal coronary angiogram: 3 different times had a total of 5 stents placed  S/P aortic valve repair      Allergies  Apresoline (Unknown)  penicillin (Unknown)        ANTIMICROBIALS:      OTHER MEDS: MEDICATIONS  (STANDING):  ALBUTerol    90 MICROgram(s) HFA Inhaler 2 every 6 hours  allopurinol 100 daily  atorvastatin 40 at bedtime  clopidogrel Tablet 75 daily  docusate sodium 100 three times a day  mesalamine DR Capsule 800 <User Schedule>  pantoprazole    Tablet 40 before breakfast  tamsulosin 0.4 at bedtime  tiotropium 18 MICROgram(s) Capsule 1 daily      SOCIAL HISTORY:  [ ] etoh [ ] tobacco [x ] former smoker [ ] IVDU    FAMILY HISTORY:  No pertinent family history in first degree relatives  Family history of cerebrovascular accident      REVIEW OF SYSTEMS  [  ] ROS unobtainable because:    [ x ] All other systems negative except as noted below:	    Constitutional:  [ ] fever [ ] weight loss  Skin:  [ ] rash [ ] phlebitis	  Eyes: [ ] icterus [ ] inflammation	  ENMT: [ ] discharge [ ] thrush [ ] ulcers [ ] exudates  Respiratory: [ ] dyspnea [ ] hemoptysis [x ] cough [ x] sputum	  Cardiovascular:  [ ] chest pain [ ] palpitations [ ] edema	  Gastrointestinal:  [ ] nausea [ ] vomiting [ ] diarrhea [ ] constipation [ ] pain	  Genitourinary:  [ ] dysuria [ ] frequency [ ] hematuria [ ] discharge [ ] flank pain  Musculoskeletal:  [ ] myalgias [ ] arthralgias [ ] arthritis	  Neurological:  [ ] headache [ ] seizures	  Psychiatric:  [ ] anxiety [ ] depression	  Hematology/Lymphatics:  [ ] lymphadenopathy  Endocrine:  [ ] adrenal [ ] thyroid  Allergic/Immunologic:	 [ ] transplant [ ] seasonal    Vital Signs Last 24 Hrs  T(F): 98.1 (18 @ 11:39), Max: 99.4 (18 @ 09:54)    Vital Signs Last 24 Hrs  HR: 86 (18 @ 11:39) (76 - 93)  BP: 109/67 (18 @ 11:39) (82/44 - 111/61)  RR: 18 (18 @ 11:39)  SpO2: 95% (18 @ 11:39) (95% - 99%)  Wt(kg): --    PHYSICAL EXAM:  General: non-toxic lying in bed oxygen via nasal canula  HEAD/EYES: anicteric, PERRL  ENT:  supple  Cardiovascular:   S1, S2 + DIANNE  Respiratory:  clear bilaterally  GI:  soft, non-tender, normal bowel sounds  :  no CVA tenderness scrotal redness +SANCHEZ CLEAR URINE  Musculoskeletal:  right ankle tender  Neurologic:  grossly non-focal  Skin:  no rash  Lymph: no lymphadenopathy  Psychiatric:  appropriate affect  Vascular:  no phlebitis                                8.9    8.33  )-----------( 231      ( 2018 07:32 )             25.6       02-    134<L>  |  101  |  92<H>  ----------------------------<  107<H>  4.1   |  18<L>  |  2.70<H>    Ca    7.5<L>      2018 08:29    TPro  6.2  /  Alb  2.9<L>  /  TBili  1.2  /  DBili  x   /  AST  34  /  ALT  12  /  AlkPhos  119        Urinalysis Basic - ( 2018 00:05 )    Color: Yellow / Appearance: SL Turbid / S.016 / pH: x  Gluc: x / Ketone: Negative  / Bili: Negative / Urobili: Negative   Blood: x / Protein: 30 mg/dL / Nitrite: Negative   Leuk Esterase: Negative / RBC: Negative /HPF / WBC 0-2 /HPF   Sq Epi: x / Non Sq Epi: OCC /HPF / Bacteria: Negative /HPF        MICROBIOLOGY:  .Blood Blood  18   Growth in aerobic bottle: Gram Positive Cocci in Clusters  ***Blood Panel PCR results on this specimen are available  approximately 3 hours after the Gram stain result.***  Gram stain, PCR, and/or culture results may not always  correspond due to difference in methodologies.  ************************************************************  This PCR assay was performed using [employer].  The following targets are tested for: Enterococcus,  vancomycin resistant enterococci, Listeria monocytogenes,  coagulase negative staphylococci, S. aureus,  methicillin resistant S. aureus, Streptococcus agalactiae  (Group B), S. pneumoniae, S. pyogenes (Group A),  Acinetobacter baumannii, Enterobacter cloacae, E. coli,  Klebsiella oxytoca, K. pneumoniae, Proteus sp.,  Serratia marcescens, Haemophilus influenzae,  Neisseria meningitidis, Pseudomonas aeruginosa, Candida  albicans, C. glabrata, C krusei, C parapsilosis,  C. tropicalis and the KPC resistance gene.  --  Blood Culture PCR              v    Rapid RVP Result: [place] ( @ 11:52)          RADIOLOGY:  < from: Xray Ankle Complete 3 Views, Right (18 @ 16:38) >  FINDINGS:     There is no acute fracture. Anatomic alignment is maintained. The   visualized joint spaces are preserved. There are vascular calcifications.      IMPRESSION:     No acute fracture or dislocation.     < end of copied text >    < from: Xray Hip w/ Pelvis 2 or 3 Views, Left (18 @ 13:27) >  FINDINGS:     There is no acute fracture. Anatomic alignment is maintained. The   bilateral hip joint spaces are preserved. There are degenerative changes   of the lower lumbar spine. There are bilateral lower extremity vascular   calcifications.      IMPRESSION:     No acute fracture or dislocation.     < end of copied text >    < from: Xray Hip w/ Pelvis 2 or 3 Views, Left (18 @ 13:27) >  FINDINGS:     There is no acute fracture. Anatomic alignment is maintained. The   bilateral hip joint spaces are preserved. There are degenerative changes   of the lower lumbar spine. There are bilateral lower extremity vascular   calcifications.      IMPRESSION:     No acute fracture or dislocation.     < end of copied text >    < from: Xray Chest 1 View AP/PA (18 @ 13:26) >  Impression:    The heart is normal in size.The lungs are clear. Degenerative changes of   the thoracic spine. Calcified aortic knob.    < end of copied text >    < from: CT 3D Reconstruct w/ Workstation (18 @ 13:26) >  Findings:    There is no acute fracture. There is mild enlargement of the left psoas   for example on series 4 image 61 with minimal adjacent fat stranding   suggestive of a muscle strain.    There is a trace retrolisthesis of L4 on L5 with ossification in the   right foraminal region resulting in severe right foraminal narrowing.   There is chondrocalcinosis of both hip joints with minimal arthrosis.     Arterial calcifications are noted.     Impression: No acute fracture. Findings suggestive of a left iliopsoas   muscle strain.    < end of copied text >    < from: CT Head No Cont (18 @ 13:26) >  FINDINGS:    HEAD:    There is no CT evidence of acute intracranial hemorrhage, extra-axial   collection, vasogenic edema, mass effect, midline shift, central   herniation, hydrocephalus or acute territorial infarct.     There is age-appropriate cerebral volume loss. There is moderate patchy   white matter hypoattenuation which is nonspecific in etiology but likely   related to chronic microvascular ischemic disease. 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    Mild mucosal thickening of the bilateral maxillary, frontal, and ethmoid   sinuses. Small mucoid polyp/retention cyst in the left sphenoid sinus.    The mastoid air cells and middle ear cavities are grossly clear.    The calvarium is intact.    CERVICAL SPINE:    There is no evidence for acute fracture, subluxation, or prevertebral   widening. The craniocervical junction is unremarkable.    There is mild degenerative disease with loss of intervertebral disc   space, most severe at C5-C6, and vertebral body height. There is mild   neuroforaminal narrowing. There is no central canal narrowing. The   intraspinal contents are grossly unremarkable.    The lung apices are clear. The surrounding soft tissues are unremarkable.    IMPRESSION:    Head CT: No evidence for intracranial hemorrhage, mass effect, or   displaced calvarial fracture.    Cervical spine CT: No evidence for acute displaced fracture or traumatic   malalignment. Cervical degenerative spondylosis, as described above.     < end of copied text > HPI:  88 M PMH HTN, AFIB, CHF, COPD, CKD here w/ L hip pain s/p fall while getting dressed this morning. Per pt he does not remember the incident very well but does not believe he lost consciousness or hit his head. He is on blood thinners (plavix and coumadin) for his AFIB. Pt denies precipitating sx including cp or dizziness prior to the fall. At baseline pt is sob requiring 3 duonebs per day for his COPD. He is complaining of his usual sob today as well. Pt also denies recent f/c, ap/n/v/d, headaches, balance issues, or any other associated symptoms (2018 16:41)  ID note-above reviewed. Patient does not clearly remember what brought him to the hospital. Per daughter at bedside, the day before admission he had been complaining of "not feeling well". The next day he was found on the floor by the . He denies any recent fever or illness, Has been having productive cough with white sputum which he always has. Per daughter she feels he has been more congested lately. Recently he has noted some "spots of blood" and are due to see a pulmonologist. Patient thinks he hurt his left hip when he fell, initially had pain there which has resolved and since being in the hospital he has developed right ankle pain. No recent falls or injuries. Has been having mild left eye discharge.        PAST MEDICAL & SURGICAL HISTORY:  Afib  HTN (hypertension)  CHF (congestive heart failure)  COPD (chronic obstructive pulmonary disease)  PAD (peripheral artery disease)  Hypercholesterolemia  Atrial fibrillation  Hypertension  s/p TAVR  Abnormal coronary angiogram: 3 different times had a total of 5 stents placed  S/P aortic valve repair      Allergies  Apresoline (Unknown)  penicillin (Unknown)      ANTIMICROBIALS:      OTHER MEDS: MEDICATIONS  (STANDING):  ALBUTerol    90 MICROgram(s) HFA Inhaler 2 every 6 hours  allopurinol 100 daily  atorvastatin 40 at bedtime  clopidogrel Tablet 75 daily  docusate sodium 100 three times a day  mesalamine DR Capsule 800 <User Schedule>  pantoprazole    Tablet 40 before breakfast  tamsulosin 0.4 at bedtime  tiotropium 18 MICROgram(s) Capsule 1 daily      SOCIAL HISTORY:  [ ] etoh [ ] tobacco [x ] former smoker [ ] IVDU    FAMILY HISTORY:  No pertinent family history in first degree relatives  Family history of cerebrovascular accident      REVIEW OF SYSTEMS  [  ] ROS unobtainable because:    [ x ] All other systems negative except as noted below:	    Constitutional:  [ ] fever [ ] weight loss  Skin:  [ ] rash [ ] phlebitis	  Eyes: [ ] icterus [ ] inflammation	  ENMT: [ ] discharge [ ] thrush [ ] ulcers [ ] exudates  Respiratory: [ ] dyspnea [ ] hemoptysis [x ] cough [ x] sputum	  Cardiovascular:  [ ] chest pain [ ] palpitations [ ] edema	  Gastrointestinal:  [ ] nausea [ ] vomiting [ ] diarrhea [ ] constipation [ ] pain	  Genitourinary:  [ ] dysuria [ ] frequency [ ] hematuria [ ] discharge [ ] flank pain  Musculoskeletal:  [ ] myalgias [ ] arthralgias [ ] arthritis, Rt ankle pain. 	  Neurological:  [ ] headache [ ] seizures	  Psychiatric:  [ ] anxiety [ ] depression	  Hematology/Lymphatics:  [ ] lymphadenopathy  Endocrine:  [ ] adrenal [ ] thyroid  Allergic/Immunologic:	 [ ] transplant [ ] seasonal    Vital Signs Last 24 Hrs  T(F): 98.1 (18 @ 11:39), Max: 99.4 (18 @ 09:54)    Vital Signs Last 24 Hrs  HR: 86 (18 @ 11:39) (76 - 93)  BP: 109/67 (18 @ 11:39) (82/44 - 111/61)  RR: 18 (18 @ 11:39)  SpO2: 95% (18 @ 11:39) (95% - 99%)      PHYSICAL EXAM:  General: pleasant man, non-toxic lying in bed oxygen via nasal canula  HEAD/EYES: anicteric, PERRL  ENT:  supple  Cardiovascular:   S1, S2 + DIANNE  Respiratory:  clear bilaterally  GI:  soft, non-tender, normal bowel sounds  :  no CVA tenderness scrotal redness +SANCHEZ CLEAR URINE  Musculoskeletal:  right ankle tender, no warmth, redness or swelling.   Neurologic:  grossly non-focal  Skin:  no rash  Lymph: no lymphadenopathy  Psychiatric:  appropriate affect  Vascular:  no phlebitis                          8.9    8.33  )-----------( 231      ( 2018 07:32 )             25.6       02-    134<L>  |  101  |  92<H>  ----------------------------<  107<H>  4.1   |  18<L>  |  2.70<H>    Ca    7.5<L>      2018 08:29    TPro  6.2  /  Alb  2.9<L>  /  TBili  1.2  /  DBili  x   /  AST  34  /  ALT  12  /  AlkPhos  119        Urinalysis Basic - ( 2018 00:05 )    Color: Yellow / Appearance: SL Turbid / S.016 / pH: x  Gluc: x / Ketone: Negative  / Bili: Negative / Urobili: Negative   Blood: x / Protein: 30 mg/dL / Nitrite: Negative   Leuk Esterase: Negative / RBC: Negative /HPF / WBC 0-2 /HPF   Sq Epi: x / Non Sq Epi: OCC /HPF / Bacteria: Negative /HPF        MICROBIOLOGY:  .Blood Blood  18   Growth in aerobic bottle: Gram Positive Cocci in Clusters      Rapid RVP Result: NotDetec ( @ 11:52)      RADIOLOGY: Reviewed by me.   < from: Xray Ankle Complete 3 Views, Right (18 @ 16:38) >  FINDINGS:     There is no acute fracture. Anatomic alignment is maintained. The   visualized joint spaces are preserved. There are vascular calcifications.      IMPRESSION:     No acute fracture or dislocation.         < from: Xray Hip w/ Pelvis 2 or 3 Views, Left (18 @ 13:27) >  FINDINGS:     There is no acute fracture. Anatomic alignment is maintained. The   bilateral hip joint spaces are preserved. There are degenerative changes   of the lower lumbar spine. There are bilateral lower extremity vascular   calcifications.      IMPRESSION:     No acute fracture or dislocation.         < from: Xray Chest 1 View AP/PA (18 @ 13:26) >  Impression:    The heart is normal in size. The lungs are clear. Degenerative changes of   the thoracic spine. Calcified aortic knob.        < from: CT 3D Reconstruct w/ Workstation (18 @ 13:26) >  Findings:    There is no acute fracture. There is mild enlargement of the left psoas   for example on series 4 image 61 with minimal adjacent fat stranding   suggestive of a muscle strain.    There is a trace retrolisthesis of L4 on L5 with ossification in the   right foraminal region resulting in severe right foraminal narrowing.   There is chondrocalcinosis of both hip joints with minimal arthrosis.     Arterial calcifications are noted.     Impression: No acute fracture. Findings suggestive of a left iliopsoas   muscle strain.      < from: CT Head No Cont (18 @ 13:26) >  FINDINGS:    HEAD:    There is no CT evidence of acute intracranial hemorrhage, extra-axial   collection, vasogenic edema, mass effect, midline shift, central   herniation, hydrocephalus or acute territorial infarct.     There is age-appropriate cerebral volume loss. There is moderate patchy   white matter hypoattenuation which is nonspecific in etiology but likely   related to chronic microvascular ischemic disease. 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    Mild mucosal thickening of the bilateral maxillary, frontal, and ethmoid   sinuses. Small mucoid polyp/retention cyst in the left sphenoid sinus.    The mastoid air cells and middle ear cavities are grossly clear.    The calvarium is intact.    CERVICAL SPINE:    There is no evidence for acute fracture, subluxation, or prevertebral   widening. The craniocervical junction is unremarkable.    There is mild degenerative disease with loss of intervertebral disc   space, most severe at C5-C6, and vertebral body height. There is mild   neuroforaminal narrowing. There is no central canal narrowing. The   intraspinal contents are grossly unremarkable.    The lung apices are clear. The surrounding soft tissues are unremarkable.    IMPRESSION:    Head CT: No evidence for intracranial hemorrhage, mass effect, or   displaced calvarial fracture.    Cervical spine CT: No evidence for acute displaced fracture or traumatic   malalignment. Cervical degenerative spondylosis, as described above.

## 2018-02-02 NOTE — CHART NOTE - NSCHARTNOTEFT_GEN_A_CORE
Called by RN to report pt with hypotension BP of 76/40. Pt seen and examined at bedside. Pt is A&Ox3, denies lightheadedness, dizziness, CP, SOB, coughing up blood, abd pain, blood in stool or urine.    Patient is a 88y old  Male who presents with a chief complaint of L hip pain (30 Jan 2018 16:41)    Vital Signs Last 24 Hrs  T(C): 36.7 (02 Feb 2018 11:39), Max: 36.7 (02 Feb 2018 11:39)  T(F): 98.1 (02 Feb 2018 11:39), Max: 98.1 (02 Feb 2018 11:39)  HR: 86 (02 Feb 2018 14:27) (84 - 93)  BP: 84/40 (02 Feb 2018 14:27) (76/40 - 111/61)  BP(mean): --  RR: 18 (02 Feb 2018 11:39) (18 - 20)  SpO2: 95% (02 Feb 2018 11:39) (95% - 99%)                        8.7    8.2   )-----------( 176      ( 02 Feb 2018 15:10 )             25.4     02-02    134<L>  |  101  |  92<H>  ----------------------------<  107<H>  4.1   |  18<L>  |  2.70<H>    Ca    7.5<L>      02 Feb 2018 08:29    TPro  6.2  /  Alb  2.9<L>  /  TBili  1.2  /  DBili  x   /  AST  34  /  ALT  12  /  AlkPhos  119  02-01    PT/INR - ( 02 Feb 2018 07:32 )   PT: 52.0 sec;   INR: 4.46 ratio         General: WN/WD NAD  Neurology: A&Ox3, nonfocal, [place] x 4  Respiratory: CTA B/L  CV: Afib rate controlled   Abdominal: Soft, NT, ND no palpable mass  MSK: No edema, + peripheral pulses, FROM all 4 extremity    A/P  HPI:  88M pmhx htn, afib, chf, copd, here w/ L hip pain s/p fall while getting dressed this morning. Per pt he does not remember the incident very well but does not believe he lost consciousness or hit his head. He is on bloodt hinners (plavix and coumadin) for his aFib. Pt denies precipitating sx including cp or dizziness prior to the fall. At baseline pt is sob requiring 3 duonebs per day for his COPD. He is complaining of his usual sob today as well. Pt also denies recent f/c, ap/n/v/d, headaches, balance issues, or any other associated symptoms now with hypotension    - 250 cc bolus of NS  - NS at 40cc/hr for 12 hours  - All htn medication discontinued  - STAT CBC, no s/s of bleeding noted  - Stool occult ordered  -Pt is asymptomatic, discussed with Dr. Alas and ,  low threshold for MICU, will continue to closely monitor. MICU consult to be called if pt becomes symptomatic. Will continue to monitor closely.     Lissette Claudio  spectra-link [phone]

## 2018-02-02 NOTE — CONSULT NOTE ADULT - ATTENDING COMMENTS
Linsey Meyers  Pager: [phone]. If no response or past 5 pm call [phone].  Please call ID service for questions over weekend at [phone].

## 2018-02-02 NOTE — PROVIDER CONTACT NOTE (OTHER) - ACTION/TREATMENT ORDERED:
NP to order bolus of NS for patient. Continue to monitor.
Will order 250 ml bolus NS. Follow up BP after bolus. Continue to monitor.
PA notified, will order tessalon perle for cough, if medication does not work, will reevaluate. Will continue to monitor.

## 2018-02-02 NOTE — PROGRESS NOTE ADULT - PROBLEM SELECTOR PLAN 1
Etiology?  Likely ATN sec to hemodynamic changes, BP is low  Rule out Pre-renal state, getting IVF  Get UA, Urine urea nitrogen, Cr, eosinophil, renal US.

## 2018-02-02 NOTE — PROVIDER CONTACT NOTE (CRITICAL VALUE NOTIFICATION) - SITUATION
Patient's blood culture taken on 1st february, the preliminary results are positive for gram positive cocci in clusters in the aerobic bottle

## 2018-02-03 LAB
ANION GAP SERPL CALC-SCNC: 15 MMOL/L — SIGNIFICANT CHANGE UP (ref 5–17)
BUN SERPL-MCNC: 75 MG/DL — HIGH (ref 7–23)
CALCIUM SERPL-MCNC: 8.3 MG/DL — LOW (ref 8.4–10.5)
CHLORIDE SERPL-SCNC: 104 MMOL/L — SIGNIFICANT CHANGE UP (ref 96–108)
CO2 SERPL-SCNC: 16 MMOL/L — LOW (ref 22–31)
CREAT SERPL-MCNC: 2.23 MG/DL — HIGH (ref 0.5–1.3)
GLUCOSE SERPL-MCNC: 92 MG/DL — SIGNIFICANT CHANGE UP (ref 70–99)
HCT VFR BLD CALC: 25.1 % — LOW (ref 39–50)
HGB BLD-MCNC: 8.6 G/DL — LOW (ref 13–17)
MAGNESIUM SERPL-MCNC: 1.4 MG/DL — LOW (ref 1.6–2.6)
MCHC RBC-ENTMCNC: 28.9 PG — SIGNIFICANT CHANGE UP (ref 27–34)
MCHC RBC-ENTMCNC: 34.3 GM/DL — SIGNIFICANT CHANGE UP (ref 32–36)
MCV RBC AUTO: 84.2 FL — SIGNIFICANT CHANGE UP (ref 80–100)
OSMOLALITY SERPL: 299 MOS/KG — SIGNIFICANT CHANGE UP (ref 275–300)
OSMOLALITY UR: 446 MOS/KG — SIGNIFICANT CHANGE UP (ref 50–1200)
PLATELET # BLD AUTO: 233 K/UL — SIGNIFICANT CHANGE UP (ref 150–400)
POTASSIUM SERPL-MCNC: 4.3 MMOL/L — SIGNIFICANT CHANGE UP (ref 3.5–5.3)
POTASSIUM SERPL-SCNC: 4.3 MMOL/L — SIGNIFICANT CHANGE UP (ref 3.5–5.3)
RBC # BLD: 2.98 M/UL — LOW (ref 4.2–5.8)
RBC # FLD: 15.4 % — HIGH (ref 10.3–14.5)
SODIUM SERPL-SCNC: 135 MMOL/L — SIGNIFICANT CHANGE UP (ref 135–145)
TSH SERPL-MCNC: 0.55 UIU/ML — SIGNIFICANT CHANGE UP (ref 0.27–4.2)
WBC # BLD: 6.29 K/UL — SIGNIFICANT CHANGE UP (ref 3.8–10.5)
WBC # FLD AUTO: 6.29 K/UL — SIGNIFICANT CHANGE UP (ref 3.8–10.5)

## 2018-02-03 RX ORDER — SODIUM BICARBONATE 1 MEQ/ML
0.07 SYRINGE (ML) INTRAVENOUS
Qty: 75 | Refills: 0 | Status: COMPLETED | OUTPATIENT
Start: 2018-02-03 | End: 2018-02-03

## 2018-02-03 RX ADMIN — Medication 100 MILLIGRAM(S): at 06:09

## 2018-02-03 RX ADMIN — PANTOPRAZOLE SODIUM 40 MILLIGRAM(S): 20 TABLET, DELAYED RELEASE ORAL at 06:09

## 2018-02-03 RX ADMIN — ALBUTEROL 2 PUFF(S): 90 AEROSOL, METERED ORAL at 11:25

## 2018-02-03 RX ADMIN — Medication 100 MILLIGRAM(S): at 11:25

## 2018-02-03 RX ADMIN — ALBUTEROL 2 PUFF(S): 90 AEROSOL, METERED ORAL at 23:46

## 2018-02-03 RX ADMIN — SODIUM CHLORIDE 40 MILLILITER(S): 9 INJECTION INTRAMUSCULAR; INTRAVENOUS; SUBCUTANEOUS at 02:36

## 2018-02-03 RX ADMIN — TAMSULOSIN HYDROCHLORIDE 0.4 MILLIGRAM(S): 0.4 CAPSULE ORAL at 21:21

## 2018-02-03 RX ADMIN — ALBUTEROL 2 PUFF(S): 90 AEROSOL, METERED ORAL at 06:09

## 2018-02-03 RX ADMIN — Medication 60 MEQ/KG/HR: at 21:20

## 2018-02-03 RX ADMIN — ALBUTEROL 2 PUFF(S): 90 AEROSOL, METERED ORAL at 17:13

## 2018-02-03 RX ADMIN — ATORVASTATIN CALCIUM 40 MILLIGRAM(S): 80 TABLET, FILM COATED ORAL at 21:21

## 2018-02-03 RX ADMIN — CLOPIDOGREL BISULFATE 75 MILLIGRAM(S): 75 TABLET, FILM COATED ORAL at 11:25

## 2018-02-03 RX ADMIN — TIOTROPIUM BROMIDE 1 CAPSULE(S): 18 CAPSULE ORAL; RESPIRATORY (INHALATION) at 11:25

## 2018-02-03 NOTE — PROGRESS NOTE ADULT - PROBLEM SELECTOR PLAN 1
Work up suggest Pre-renal  NS 75cc/hr Work up suggest Pre-renal  1/2NS +NaHCO3 75meq: 60cc/hr for 1L

## 2018-02-04 LAB
ANION GAP SERPL CALC-SCNC: 11 MMOL/L — SIGNIFICANT CHANGE UP (ref 5–17)
ANION GAP SERPL CALC-SCNC: 12 MMOL/L — SIGNIFICANT CHANGE UP (ref 5–17)
BUN SERPL-MCNC: 57 MG/DL — HIGH (ref 7–23)
BUN SERPL-MCNC: 67 MG/DL — HIGH (ref 7–23)
CALCIUM SERPL-MCNC: 7.9 MG/DL — LOW (ref 8.4–10.5)
CALCIUM SERPL-MCNC: 8.4 MG/DL — SIGNIFICANT CHANGE UP (ref 8.4–10.5)
CHLORIDE SERPL-SCNC: 102 MMOL/L — SIGNIFICANT CHANGE UP (ref 96–108)
CHLORIDE SERPL-SCNC: 103 MMOL/L — SIGNIFICANT CHANGE UP (ref 96–108)
CO2 SERPL-SCNC: 19 MMOL/L — LOW (ref 22–31)
CO2 SERPL-SCNC: 19 MMOL/L — LOW (ref 22–31)
CREAT SERPL-MCNC: 1.77 MG/DL — HIGH (ref 0.5–1.3)
CREAT SERPL-MCNC: 1.92 MG/DL — HIGH (ref 0.5–1.3)
CULTURE RESULTS: SIGNIFICANT CHANGE UP
GLUCOSE SERPL-MCNC: 112 MG/DL — HIGH (ref 70–99)
GLUCOSE SERPL-MCNC: 93 MG/DL — SIGNIFICANT CHANGE UP (ref 70–99)
HCT VFR BLD CALC: 26.8 % — LOW (ref 39–50)
HGB BLD-MCNC: 8.4 G/DL — LOW (ref 13–17)
INR BLD: 2.54 RATIO — HIGH (ref 0.88–1.16)
MCHC RBC-ENTMCNC: 28.8 PG — SIGNIFICANT CHANGE UP (ref 27–34)
MCHC RBC-ENTMCNC: 31.3 GM/DL — LOW (ref 32–36)
MCV RBC AUTO: 91.8 FL — SIGNIFICANT CHANGE UP (ref 80–100)
OB PNL STL: NEGATIVE — SIGNIFICANT CHANGE UP
ORGANISM # SPEC MICROSCOPIC CNT: SIGNIFICANT CHANGE UP
ORGANISM # SPEC MICROSCOPIC CNT: SIGNIFICANT CHANGE UP
PLATELET # BLD AUTO: 220 K/UL — SIGNIFICANT CHANGE UP (ref 150–400)
POTASSIUM SERPL-MCNC: 4.9 MMOL/L — SIGNIFICANT CHANGE UP (ref 3.5–5.3)
POTASSIUM SERPL-MCNC: 5.5 MMOL/L — HIGH (ref 3.5–5.3)
POTASSIUM SERPL-SCNC: 4.9 MMOL/L — SIGNIFICANT CHANGE UP (ref 3.5–5.3)
POTASSIUM SERPL-SCNC: 5.5 MMOL/L — HIGH (ref 3.5–5.3)
PROTHROM AB SERPL-ACNC: 29.2 SEC — HIGH (ref 10–13.1)
RBC # BLD: 2.92 M/UL — LOW (ref 4.2–5.8)
RBC # FLD: 15.2 % — HIGH (ref 10.3–14.5)
SODIUM SERPL-SCNC: 133 MMOL/L — LOW (ref 135–145)
SODIUM SERPL-SCNC: 133 MMOL/L — LOW (ref 135–145)
SPECIMEN SOURCE: SIGNIFICANT CHANGE UP
WBC # BLD: 5.94 K/UL — SIGNIFICANT CHANGE UP (ref 3.8–10.5)
WBC # FLD AUTO: 5.94 K/UL — SIGNIFICANT CHANGE UP (ref 3.8–10.5)

## 2018-02-04 RX ORDER — WARFARIN SODIUM 2.5 MG/1
2 TABLET ORAL ONCE
Qty: 0 | Refills: 0 | Status: COMPLETED | OUTPATIENT
Start: 2018-02-04 | End: 2018-02-04

## 2018-02-04 RX ORDER — SODIUM BICARBONATE 1 MEQ/ML
0.06 SYRINGE (ML) INTRAVENOUS
Qty: 75 | Refills: 0 | Status: COMPLETED | OUTPATIENT
Start: 2018-02-04 | End: 2018-02-04

## 2018-02-04 RX ADMIN — WARFARIN SODIUM 2 MILLIGRAM(S): 2.5 TABLET ORAL at 21:17

## 2018-02-04 RX ADMIN — ATORVASTATIN CALCIUM 40 MILLIGRAM(S): 80 TABLET, FILM COATED ORAL at 21:17

## 2018-02-04 RX ADMIN — ALBUTEROL 2 PUFF(S): 90 AEROSOL, METERED ORAL at 12:17

## 2018-02-04 RX ADMIN — TAMSULOSIN HYDROCHLORIDE 0.4 MILLIGRAM(S): 0.4 CAPSULE ORAL at 21:17

## 2018-02-04 RX ADMIN — PANTOPRAZOLE SODIUM 40 MILLIGRAM(S): 20 TABLET, DELAYED RELEASE ORAL at 05:10

## 2018-02-04 RX ADMIN — CLOPIDOGREL BISULFATE 75 MILLIGRAM(S): 75 TABLET, FILM COATED ORAL at 12:17

## 2018-02-04 RX ADMIN — ALBUTEROL 2 PUFF(S): 90 AEROSOL, METERED ORAL at 05:10

## 2018-02-04 RX ADMIN — ALBUTEROL 2 PUFF(S): 90 AEROSOL, METERED ORAL at 17:04

## 2018-02-04 RX ADMIN — TIOTROPIUM BROMIDE 1 CAPSULE(S): 18 CAPSULE ORAL; RESPIRATORY (INHALATION) at 12:17

## 2018-02-04 RX ADMIN — ALBUTEROL 2 PUFF(S): 90 AEROSOL, METERED ORAL at 23:35

## 2018-02-04 RX ADMIN — Medication 50 MEQ/KG/HR: at 19:18

## 2018-02-04 RX ADMIN — Medication 100 MILLIGRAM(S): at 12:17

## 2018-02-05 LAB
ANION GAP SERPL CALC-SCNC: 15 MMOL/L — SIGNIFICANT CHANGE UP (ref 5–17)
BUN SERPL-MCNC: 53 MG/DL — HIGH (ref 7–23)
CALCIUM SERPL-MCNC: 8.3 MG/DL — LOW (ref 8.4–10.5)
CHLORIDE SERPL-SCNC: 99 MMOL/L — SIGNIFICANT CHANGE UP (ref 96–108)
CO2 SERPL-SCNC: 22 MMOL/L — SIGNIFICANT CHANGE UP (ref 22–31)
CREAT SERPL-MCNC: 1.49 MG/DL — HIGH (ref 0.5–1.3)
GLUCOSE SERPL-MCNC: 91 MG/DL — SIGNIFICANT CHANGE UP (ref 70–99)
HCT VFR BLD CALC: 25.6 % — LOW (ref 39–50)
HGB BLD-MCNC: 8.3 G/DL — LOW (ref 13–17)
INR BLD: 2.04 RATIO — HIGH (ref 0.88–1.16)
MCHC RBC-ENTMCNC: 29.3 PG — SIGNIFICANT CHANGE UP (ref 27–34)
MCHC RBC-ENTMCNC: 32.4 GM/DL — SIGNIFICANT CHANGE UP (ref 32–36)
MCV RBC AUTO: 90.5 FL — SIGNIFICANT CHANGE UP (ref 80–100)
PLATELET # BLD AUTO: 241 K/UL — SIGNIFICANT CHANGE UP (ref 150–400)
POTASSIUM SERPL-MCNC: 4.7 MMOL/L — SIGNIFICANT CHANGE UP (ref 3.5–5.3)
POTASSIUM SERPL-SCNC: 4.7 MMOL/L — SIGNIFICANT CHANGE UP (ref 3.5–5.3)
PROTHROM AB SERPL-ACNC: 23.4 SEC — HIGH (ref 10–13.1)
RBC # BLD: 2.83 M/UL — LOW (ref 4.2–5.8)
RBC # FLD: 15.2 % — HIGH (ref 10.3–14.5)
SODIUM SERPL-SCNC: 136 MMOL/L — SIGNIFICANT CHANGE UP (ref 135–145)
WBC # BLD: 6.24 K/UL — SIGNIFICANT CHANGE UP (ref 3.8–10.5)
WBC # FLD AUTO: 6.24 K/UL — SIGNIFICANT CHANGE UP (ref 3.8–10.5)

## 2018-02-05 PROCEDURE — 99232 SBSQ HOSP IP/OBS MODERATE 35: CPT

## 2018-02-05 PROCEDURE — 76775 US EXAM ABDO BACK WALL LIM: CPT | Mod: 26

## 2018-02-05 RX ORDER — WARFARIN SODIUM 2.5 MG/1
3 TABLET ORAL ONCE
Qty: 0 | Refills: 0 | Status: COMPLETED | OUTPATIENT
Start: 2018-02-05 | End: 2018-02-05

## 2018-02-05 RX ORDER — ACETAMINOPHEN 500 MG
650 TABLET ORAL ONCE
Qty: 0 | Refills: 0 | Status: COMPLETED | OUTPATIENT
Start: 2018-02-05 | End: 2018-02-05

## 2018-02-05 RX ORDER — IPRATROPIUM/ALBUTEROL SULFATE 18-103MCG
3 AEROSOL WITH ADAPTER (GRAM) INHALATION EVERY 6 HOURS
Qty: 0 | Refills: 0 | Status: DISCONTINUED | OUTPATIENT
Start: 2018-02-05 | End: 2018-02-06

## 2018-02-05 RX ADMIN — TIOTROPIUM BROMIDE 1 CAPSULE(S): 18 CAPSULE ORAL; RESPIRATORY (INHALATION) at 11:59

## 2018-02-05 RX ADMIN — PANTOPRAZOLE SODIUM 40 MILLIGRAM(S): 20 TABLET, DELAYED RELEASE ORAL at 05:11

## 2018-02-05 RX ADMIN — WARFARIN SODIUM 3 MILLIGRAM(S): 2.5 TABLET ORAL at 22:13

## 2018-02-05 RX ADMIN — Medication 650 MILLIGRAM(S): at 22:13

## 2018-02-05 RX ADMIN — Medication 650 MILLIGRAM(S): at 22:14

## 2018-02-05 RX ADMIN — ALBUTEROL 2 PUFF(S): 90 AEROSOL, METERED ORAL at 11:59

## 2018-02-05 RX ADMIN — ATORVASTATIN CALCIUM 40 MILLIGRAM(S): 80 TABLET, FILM COATED ORAL at 22:13

## 2018-02-05 RX ADMIN — TAMSULOSIN HYDROCHLORIDE 0.4 MILLIGRAM(S): 0.4 CAPSULE ORAL at 22:13

## 2018-02-05 RX ADMIN — Medication 3 MILLILITER(S): at 16:19

## 2018-02-05 RX ADMIN — ALBUTEROL 2 PUFF(S): 90 AEROSOL, METERED ORAL at 18:01

## 2018-02-05 RX ADMIN — Medication 3 MILLILITER(S): at 22:57

## 2018-02-05 RX ADMIN — Medication 3 MILLILITER(S): at 10:06

## 2018-02-05 RX ADMIN — CLOPIDOGREL BISULFATE 75 MILLIGRAM(S): 75 TABLET, FILM COATED ORAL at 11:54

## 2018-02-05 RX ADMIN — ALBUTEROL 2 PUFF(S): 90 AEROSOL, METERED ORAL at 05:12

## 2018-02-05 RX ADMIN — Medication 100 MILLIGRAM(S): at 11:54

## 2018-02-05 NOTE — PROGRESS NOTE ADULT - PROBLEM SELECTOR PLAN 1
Work up suggest Pre-renal  Renal function is improving  Hold IVF now in view of h/o CHF  Use lasix PRN

## 2018-02-05 NOTE — DIETITIAN INITIAL EVALUATION ADULT. - ORAL INTAKE PTA
Patient reports fair appetite & PO Intake PTA. States he lives in an assisted living facility where meals are provided daily. States he typically has a big breakfast, may skip lunch if not hungry/does not like the meal, and PO intake of dinner varies depending on what meal is provided/fair

## 2018-02-05 NOTE — DIETITIAN INITIAL EVALUATION ADULT. - ENERGY NEEDS
Ht 67 inches Wt 146.6 pounds BMI 23 Kg/m^2   pounds +/- 10%; 99% IBW  Edema: none noted Skin: no pressure injuries  Other pertinent information: 87 yo male with PMH of HTN, A fib on coumadin, CHF, COPD admitted S/P mechanical fall with left hip pain and ELKIN per chart

## 2018-02-05 NOTE — PROGRESS NOTE ADULT - ATTENDING COMMENTS
Linsey Meyers  Pager: [phone]. If no response or past 5 pm call [phone]. Linsey Meyers  Pager: [phone]. If no response or past 5 pm call [phone].    Will sign off, please call with questions.

## 2018-02-05 NOTE — DIETITIAN INITIAL EVALUATION ADULT. - OTHER INFO
Nutrition consult received for PMH on coumadin and patient seen for length of stay on [address]. Reports UBW of 144 pounds and suspected weight loss. Noted weight upon admission (1/31) of 146.6 pounds and current weight (2/5) of 156 pounds. Weight trending up since admission- ?fluid shifts vs weight gain. Reports fair appetite & PO intake during this admission. Per chart, variable PO intake ranging from % since admission noted. Patient declined nutritional supplementation at this time but amenable to trying health shake. Food preferences noted and to be honored. Reports NKFA. Denies chewing/swallowing difficulty. Denies nausea/emesis. Last BM 2/4 per chart.

## 2018-02-05 NOTE — DIETITIAN INITIAL EVALUATION ADULT. - NS AS NUTRI INTERV ED CONTENT
Provided diet education regarding coumadin and vitamin K interaction. Discussed the action of vitamin K in the body, interaction between the medication and vitamin, importance of maintaining a consistent intake of vitamin K daily to promote medication efficacy, and food sources high/low in vitamin K. Provided written education on coumadin and vitamin K interaction at bedside. Briefly reviewed daily weight parameters for CHF. Suspected poor-fair retention, patient will likely need diet education reinforcement

## 2018-02-05 NOTE — DIETITIAN INITIAL EVALUATION ADULT. - ADHERENCE
Patient with poor teach back on coumadin diet education. Briefly reviewed diet education regarding coumadin and foods high in vitamin K. Patient with PMH of CHF- stated that he is weighed monthly at assisted living facility but he weighs himself daily. Poor teach back on weight parameters for monitoring. Patient states he takes vitamin B12 daily PTA

## 2018-02-06 ENCOUNTER — TRANSCRIPTION ENCOUNTER (OUTPATIENT)
Age: 83
End: 2018-02-06

## 2018-02-06 VITALS — WEIGHT: 156.97 LBS

## 2018-02-06 LAB
ANION GAP SERPL CALC-SCNC: 14 MMOL/L — SIGNIFICANT CHANGE UP (ref 5–17)
BUN SERPL-MCNC: 48 MG/DL — HIGH (ref 7–23)
CALCIUM SERPL-MCNC: 8.1 MG/DL — LOW (ref 8.4–10.5)
CHLORIDE SERPL-SCNC: 101 MMOL/L — SIGNIFICANT CHANGE UP (ref 96–108)
CO2 SERPL-SCNC: 21 MMOL/L — LOW (ref 22–31)
CREAT SERPL-MCNC: 1.36 MG/DL — HIGH (ref 0.5–1.3)
CULTURE RESULTS: SIGNIFICANT CHANGE UP
GLUCOSE SERPL-MCNC: 92 MG/DL — SIGNIFICANT CHANGE UP (ref 70–99)
HCT VFR BLD CALC: 23.8 % — LOW (ref 39–50)
HCT VFR BLD CALC: 27.3 % — LOW (ref 39–50)
HGB BLD-MCNC: 7.8 G/DL — LOW (ref 13–17)
HGB BLD-MCNC: 9.6 G/DL — LOW (ref 13–17)
INR BLD: 1.93 RATIO — HIGH (ref 0.88–1.16)
MCHC RBC-ENTMCNC: 28.7 PG — SIGNIFICANT CHANGE UP (ref 27–34)
MCHC RBC-ENTMCNC: 32.8 GM/DL — SIGNIFICANT CHANGE UP (ref 32–36)
MCHC RBC-ENTMCNC: 32.8 PG — SIGNIFICANT CHANGE UP (ref 27–34)
MCHC RBC-ENTMCNC: 34.9 GM/DL — SIGNIFICANT CHANGE UP (ref 32–36)
MCV RBC AUTO: 87.5 FL — SIGNIFICANT CHANGE UP (ref 80–100)
MCV RBC AUTO: 94 FL — SIGNIFICANT CHANGE UP (ref 80–100)
PLATELET # BLD AUTO: 252 K/UL — SIGNIFICANT CHANGE UP (ref 150–400)
PLATELET # BLD AUTO: 259 K/UL — SIGNIFICANT CHANGE UP (ref 150–400)
POTASSIUM SERPL-MCNC: 4.4 MMOL/L — SIGNIFICANT CHANGE UP (ref 3.5–5.3)
POTASSIUM SERPL-SCNC: 4.4 MMOL/L — SIGNIFICANT CHANGE UP (ref 3.5–5.3)
PROTHROM AB SERPL-ACNC: 22.1 SEC — HIGH (ref 10–13.1)
RBC # BLD: 2.72 M/UL — LOW (ref 4.2–5.8)
RBC # BLD: 2.91 M/UL — LOW (ref 4.2–5.8)
RBC # FLD: 14.2 % — SIGNIFICANT CHANGE UP (ref 10.3–14.5)
RBC # FLD: 15.3 % — HIGH (ref 10.3–14.5)
SODIUM SERPL-SCNC: 136 MMOL/L — SIGNIFICANT CHANGE UP (ref 135–145)
SPECIMEN SOURCE: SIGNIFICANT CHANGE UP
WBC # BLD: 5.66 K/UL — SIGNIFICANT CHANGE UP (ref 3.8–10.5)
WBC # BLD: 6.6 K/UL — SIGNIFICANT CHANGE UP (ref 3.8–10.5)
WBC # FLD AUTO: 5.66 K/UL — SIGNIFICANT CHANGE UP (ref 3.8–10.5)
WBC # FLD AUTO: 6.6 K/UL — SIGNIFICANT CHANGE UP (ref 3.8–10.5)

## 2018-02-06 PROCEDURE — 73610 X-RAY EXAM OF ANKLE: CPT

## 2018-02-06 PROCEDURE — 84484 ASSAY OF TROPONIN QUANT: CPT

## 2018-02-06 PROCEDURE — 82330 ASSAY OF CALCIUM: CPT

## 2018-02-06 PROCEDURE — 83935 ASSAY OF URINE OSMOLALITY: CPT

## 2018-02-06 PROCEDURE — 80076 HEPATIC FUNCTION PANEL: CPT

## 2018-02-06 PROCEDURE — 82550 ASSAY OF CK (CPK): CPT

## 2018-02-06 PROCEDURE — 82553 CREATINE MB FRACTION: CPT

## 2018-02-06 PROCEDURE — 84443 ASSAY THYROID STIM HORMONE: CPT

## 2018-02-06 PROCEDURE — 85610 PROTHROMBIN TIME: CPT

## 2018-02-06 PROCEDURE — 87040 BLOOD CULTURE FOR BACTERIA: CPT

## 2018-02-06 PROCEDURE — 80048 BASIC METABOLIC PNL TOTAL CA: CPT

## 2018-02-06 PROCEDURE — 72192 CT PELVIS W/O DYE: CPT

## 2018-02-06 PROCEDURE — 84300 ASSAY OF URINE SODIUM: CPT

## 2018-02-06 PROCEDURE — 73502 X-RAY EXAM HIP UNI 2-3 VIEWS: CPT

## 2018-02-06 PROCEDURE — 99285 EMERGENCY DEPT VISIT HI MDM: CPT | Mod: 25

## 2018-02-06 PROCEDURE — 85027 COMPLETE CBC AUTOMATED: CPT

## 2018-02-06 PROCEDURE — 82272 OCCULT BLD FECES 1-3 TESTS: CPT

## 2018-02-06 PROCEDURE — 71045 X-RAY EXAM CHEST 1 VIEW: CPT

## 2018-02-06 PROCEDURE — 82435 ASSAY OF BLOOD CHLORIDE: CPT

## 2018-02-06 PROCEDURE — 70450 CT HEAD/BRAIN W/O DYE: CPT

## 2018-02-06 PROCEDURE — 84132 ASSAY OF SERUM POTASSIUM: CPT

## 2018-02-06 PROCEDURE — 82947 ASSAY GLUCOSE BLOOD QUANT: CPT

## 2018-02-06 PROCEDURE — 94640 AIRWAY INHALATION TREATMENT: CPT

## 2018-02-06 PROCEDURE — 97161 PT EVAL LOW COMPLEX 20 MIN: CPT

## 2018-02-06 PROCEDURE — 84295 ASSAY OF SERUM SODIUM: CPT

## 2018-02-06 PROCEDURE — 82533 TOTAL CORTISOL: CPT

## 2018-02-06 PROCEDURE — 83735 ASSAY OF MAGNESIUM: CPT

## 2018-02-06 PROCEDURE — 95819 EEG AWAKE AND ASLEEP: CPT

## 2018-02-06 PROCEDURE — 80053 COMPREHEN METABOLIC PANEL: CPT

## 2018-02-06 PROCEDURE — 83605 ASSAY OF LACTIC ACID: CPT

## 2018-02-06 PROCEDURE — 87086 URINE CULTURE/COLONY COUNT: CPT

## 2018-02-06 PROCEDURE — 87150 DNA/RNA AMPLIFIED PROBE: CPT

## 2018-02-06 PROCEDURE — 76377 3D RENDER W/INTRP POSTPROCES: CPT

## 2018-02-06 PROCEDURE — 82140 ASSAY OF AMMONIA: CPT

## 2018-02-06 PROCEDURE — 82803 BLOOD GASES ANY COMBINATION: CPT

## 2018-02-06 PROCEDURE — 76775 US EXAM ABDO BACK WALL LIM: CPT

## 2018-02-06 PROCEDURE — 83930 ASSAY OF BLOOD OSMOLALITY: CPT

## 2018-02-06 PROCEDURE — 85730 THROMBOPLASTIN TIME PARTIAL: CPT

## 2018-02-06 PROCEDURE — 85014 HEMATOCRIT: CPT

## 2018-02-06 PROCEDURE — 72125 CT NECK SPINE W/O DYE: CPT

## 2018-02-06 PROCEDURE — 93005 ELECTROCARDIOGRAM TRACING: CPT

## 2018-02-06 PROCEDURE — 87798 DETECT AGENT NOS DNA AMP: CPT

## 2018-02-06 PROCEDURE — 87486 CHLMYD PNEUM DNA AMP PROBE: CPT

## 2018-02-06 PROCEDURE — 87581 M.PNEUMON DNA AMP PROBE: CPT

## 2018-02-06 PROCEDURE — 84540 ASSAY OF URINE/UREA-N: CPT

## 2018-02-06 PROCEDURE — 87633 RESP VIRUS 12-25 TARGETS: CPT

## 2018-02-06 PROCEDURE — 81001 URINALYSIS AUTO W/SCOPE: CPT

## 2018-02-06 PROCEDURE — 82570 ASSAY OF URINE CREATININE: CPT

## 2018-02-06 RX ORDER — CLOPIDOGREL BISULFATE 75 MG/1
1 TABLET, FILM COATED ORAL
Qty: 0 | Refills: 0 | COMMUNITY

## 2018-02-06 RX ORDER — FUROSEMIDE 40 MG
1 TABLET ORAL
Qty: 0 | Refills: 0 | COMMUNITY

## 2018-02-06 RX ORDER — DOCUSATE SODIUM 100 MG
1 CAPSULE ORAL
Qty: 0 | Refills: 0 | COMMUNITY

## 2018-02-06 RX ORDER — IPRATROPIUM/ALBUTEROL SULFATE 18-103MCG
3 AEROSOL WITH ADAPTER (GRAM) INHALATION
Qty: 0 | Refills: 0 | COMMUNITY
Start: 2018-02-06

## 2018-02-06 RX ORDER — WARFARIN SODIUM 2.5 MG/1
1 TABLET ORAL
Qty: 0 | Refills: 0 | COMMUNITY

## 2018-02-06 RX ORDER — ALBUTEROL 90 UG/1
2 AEROSOL, METERED ORAL
Qty: 0 | Refills: 0 | COMMUNITY
Start: 2018-02-06

## 2018-02-06 RX ORDER — DOCUSATE SODIUM 100 MG
1 CAPSULE ORAL
Qty: 0 | Refills: 0 | COMMUNITY
Start: 2018-02-06

## 2018-02-06 RX ORDER — METOPROLOL TARTRATE 50 MG
1 TABLET ORAL
Qty: 0 | Refills: 0 | COMMUNITY

## 2018-02-06 RX ORDER — IPRATROPIUM/ALBUTEROL SULFATE 18-103MCG
3 AEROSOL WITH ADAPTER (GRAM) INHALATION
Qty: 0 | Refills: 0 | COMMUNITY

## 2018-02-06 RX ORDER — SACUBITRIL AND VALSARTAN 24; 26 MG/1; MG/1
1 TABLET, FILM COATED ORAL
Qty: 0 | Refills: 0 | COMMUNITY

## 2018-02-06 RX ADMIN — CLOPIDOGREL BISULFATE 75 MILLIGRAM(S): 75 TABLET, FILM COATED ORAL at 11:23

## 2018-02-06 RX ADMIN — Medication 100 MILLIGRAM(S): at 11:23

## 2018-02-06 RX ADMIN — TIOTROPIUM BROMIDE 1 CAPSULE(S): 18 CAPSULE ORAL; RESPIRATORY (INHALATION) at 11:16

## 2018-02-06 RX ADMIN — Medication 800 MILLIGRAM(S): at 11:17

## 2018-02-06 RX ADMIN — Medication 3 MILLILITER(S): at 17:47

## 2018-02-06 RX ADMIN — PANTOPRAZOLE SODIUM 40 MILLIGRAM(S): 20 TABLET, DELAYED RELEASE ORAL at 06:16

## 2018-02-06 RX ADMIN — Medication 3 MILLILITER(S): at 06:16

## 2018-02-06 RX ADMIN — Medication 3 MILLILITER(S): at 11:28

## 2018-02-06 RX ADMIN — ALBUTEROL 2 PUFF(S): 90 AEROSOL, METERED ORAL at 11:27

## 2018-02-06 RX ADMIN — ALBUTEROL 2 PUFF(S): 90 AEROSOL, METERED ORAL at 17:50

## 2018-02-06 NOTE — PROGRESS NOTE ADULT - ASSESSMENT
88M pmhx htn, afib, chf, copd, here w/ L hip pain s/p fall while getting dressed this morning. Per pt he does not remember the incident very well but does not believe he lost consciousness or hit his head. He is on bloodthinners (plavix and coumadin) for his aFib. Pt denies precipitating sx including cp or dizziness prior to the fall. At baseline pt is sob requiring 3 duonebs per day for his COPD. He is complaining of his usual sob today as well. Pt also denies recent f/c, ap/n/v/d, headaches, balance issues, or any other associated symptoms      Fall.  Plan: with hip pain  pain control  XR and CT hip -ve  ro syncope  cards fu.      Afib.  Plan: check INR and dose coumadin daily.     Hypotension Plan hold HTN meds  sp bolus will monitor  improving now    ELKIN plan sp chamorro  renal fu     COPD (chronic obstructive pulmonary disease).  Plan: cw nebs.     Leucocytosis Plan check cultures 1/2 +  imani schuster  ID fu appreciated    case discussed with daughter at bedside
88 M PMH HTN, Afib, CAD s/p stent, COPD not on home oxygen, ex smoker, ulcerative colitis s/p TAVR 3 y ago here with Lt hip pain after a fall, possible syncopal event. No acute abnormalities on imaging or EEG  Afebrile, initially had leukocytosis to 15 (likely reactive) which is now normal. No other obvious source of infection, U/A negative, CXR with no pneumonia, no diarrhea. Acute on chronic renal failure now improving.   Unknown allergy to penicillin      Plan:  Found to have + blood culture 1/2 bottles, PCR with coagulase negative Staphylococcus. Suspect procurement contaminant   Repeat blood culture NTD.   observe off abx for now  pre renal ELKIN, nephrology on board.
88M pmhx htn, afib, chf, copd, here w/ L hip pain s/p fall while getting dressed this morning. Per pt he does not remember the incident very well but does not believe he lost consciousness or hit his head. He is on bloodthinners (plavix and coumadin) for his aFib. Pt denies precipitating sx including cp or dizziness prior to the fall. At baseline pt is sob requiring 3 duonebs per day for his COPD. He is complaining of his usual sob today as well. Pt also denies recent f/c, ap/n/v/d, headaches, balance issues, or any other associated symptoms      Fall.  Plan: with hip pain  pain control  XR and CT hip -ve  ro syncope  cards fu.      Afib.  Plan: check INR and dose coumadin daily.     Hypotension Plan hold HTN meds  sp bolus will monitor    ELKIN plan sp chamorro  renal fu     COPD (chronic obstructive pulmonary disease).  Plan: cw nebs.     Leucocytosis Plan check cultures 1/2 +  imani damonaminant  ID fu appreciated
88M pmhx htn, afib, chf, copd, here w/ L hip pain s/p fall while getting dressed this morning. Per pt he does not remember the incident very well but does not believe he lost consciousness or hit his head. He is on bloodthinners (plavix and coumadin) for his aFib. Pt denies precipitating sx including cp or dizziness prior to the fall. At baseline pt is sob requiring 3 duonebs per day for his COPD. He is complaining of his usual sob today as well. Pt also denies recent f/c, ap/n/v/d, headaches, balance issues, or any other associated symptoms      Fall.  Plan: with hip pain  pain control  XR and CT hip -ve  ro syncope  cards fu.      Afib.  Plan: check INR and dose coumadin daily.     Hypotension Plan hold HTN meds  sp bolus will monitor  improving now    ELKIN plan sp chamorro  renal fu     COPD (chronic obstructive pulmonary disease).  Plan: cw nebs.     Leucocytosis Plan check cultures 1/2 +  imani schuster  ID fu appreciated    case discussed with son at bedside    dc planning
88M pmhx htn, afib, chf, copd, here w/ L hip pain s/p fall while getting dressed this morning. Per pt he does not remember the incident very well but does not believe he lost consciousness or hit his head. He is on bloodthinners (plavix and coumadin) for his aFib. Pt denies precipitating sx including cp or dizziness prior to the fall. At baseline pt is sob requiring 3 duonebs per day for his COPD. He is complaining of his usual sob today as well. Pt also denies recent f/c, ap/n/v/d, headaches, balance issues, or any other associated symptoms      Fall.  Plan: with hip pain  pain control  XR and CT hip -ve  ro syncope  cards fu.      Afib.  Plan: check INR and dose coumadin daily.   Hold coumadin till INR is high     COPD (chronic obstructive pulmonary disease).  Plan: cw nebs.     Leucocytosis Plan check cultures    PT and dc planning
88M pmhx htn, afib, chf, copd, here w/ L hip pain s/p fall while getting dressed this morning. Per pt he does not remember the incident very well but does not believe he lost consciousness or hit his head. He is on bloodthinners (plavix and coumadin) for his aFib. Pt denies precipitating sx including cp or dizziness prior to the fall. At baseline pt is sob requiring 3 duonebs per day for his COPD. He is complaining of his usual sob today as well. Pt also denies recent f/c, ap/n/v/d, headaches, balance issues, or any other associated symptoms     Problem/Plan - 1:  ·  Problem: Fall.  Plan: with hip pain  pain control  XR and CT hip -ve  ro syncope  cards fu.     Problem/Plan - 2:  ·  Problem: Afib.  Plan: check INR and dose coumadin daily.     Problem/Plan - 3:  ·  Problem: COPD (chronic obstructive pulmonary disease).  Plan: cw nebs.     PT and dc planning
88M pmhx htn, afib, chf, copd, here w/ L hip pain s/p fall while getting dressed. Patient renal function has worsened since admission
88M pmhx htn, afib, chf, copd, here w/ L hip pain s/p fall while getting dressed. Patient renal function has worsened since admission
88M pmhx htn, afib, chf, copd, here w/ L hip pain s/p fall while getting dressed. Patient renal function worsened since admission
88M pmhx htn, afib, chf, copd, here w/ L hip pain s/p fall while getting dressed. Patient renal function worsened since admission
EKG - Afib LBBB    A/P     1) Syncope - will moniter on tele, Out pt echo shows EF mod to sev reduced , orthostatics negative , CT head, neurology on board    2) Chronic systolic CHF - hypotension improved hold toprol, entresto, lasix,  get 2d echo , improving    3) Chronic afib - INR 2 dose coumadin    4) Leukocytosis - ?infection blood cx growing coag neg staph likely contaminant,     5) cad s/p PCI - no chest pains cont plavix    6) ELKIN on CKD - likely secondary to hyotension, improving s/p IV fluids now dischontinued secondary to h/o systolic CHF    7) Hypotension - improving    8) NSVT - asymptomatic will moniter
EKG - Afib LBBB    A/P     1) Syncope - will moniter on tele, Out pt echo shows EK mod to sev reduced , orthostatics negative , CT head, neurology on board    2) Chronic systolic CHF - hypotensive hold toprol, entresto, lasix,  get 2d echo , improving    3) Chronic afib - INR 2.5 dose coumadin    4) Leukocytosis - ?infection blood cx growing coag neg staph likely contaminant,     5) cad s/p PCI - no chest pains cont plavix    6) ELKIN on CKD - likely secondary to hyotension, improving    7) Hypotension - improving    8) NSVT - asymptomatic will moniter
EKG - Afib LBBB    A/P     1) Syncope - will moniter on tele, get 2decho , orthostatics negative , CT head, neurology on board    2) Chronic systolic CHF - hypotensive hold toprol, entresto, lasix,  get 2d echo     3) Chronic afib - on coumadin INR elevated hold coumadin for now    4) Leukocytosis - ?infection blood cx growing coag neg staph likely contaminant,     5) cad s/p PCI - no chest pains cont plavix    6) ELKIN on CKD - likely secondary to hyotension, improving    7) Hypotension - improving    8) NSVT - asymptomatic will moniter
EKG - Afib LBBB    A/P     1) Syncope - will moniter on tele, get 2decho , orthostatics negative , CT head, neurology on board    2) Chronic systolic CHF - hypotensive hold toprol, entresto, lasix,  get 2d echo, IV fluids    3) Chronic afib - on coumadin INR 5 hold coumadin for now    4) Leukocytosis - ?infection maybe get UA urine and blood cx    5) cad s/p PCI - no chest pains cont plavix    6) ELKIN on CKD - likely secondary to hyotension, give 75 cc NS over 10 hours, place chamorro, nephrology consult
EKG - Afib LBBB    A/P     1) Syncope - will moniter on tele, get 2decho , orthostatics negative , CT head, neurology on board    2) Chronic systolic CHF - hypotensive hold toprol, entresto, lasix,  get 2d echo, IV fluids    3) Chronic afib - on coumadin INR elevated hold coumadin for now    4) Leukocytosis - ?infection blood cx growing coag neg staph likely contaminant, IC called    5) cad s/p PCI - no chest pains cont plavix    6) ELKIN on CKD - likely secondary to hyotension, give 75 cc NS over 10 hours, place chamorro, nephrology consult apprecuated    7) Hypotension - ? etiology, no signs of internal bleeding, f/u CBC, getting IV fluids , if persists get MICU consult,     8) NSVT - asymptomatic will moniter
Fall.  Plan: with hip pain  pain control  XR and CT hip -ve  ro syncope  cards fu.      Afib.  Plan: check INR and dose coumadin daily.     Hypotension Plan hold HTN meds  sp bolus will monitor  improving now    ELKIN plan sp chamorro  renal fu     COPD (chronic obstructive pulmonary disease).  Plan: cw nebs.     Leucocytosis Plan check cultures 1/2 +  imani contaminant  ID fu appreciated    dc planning today
88M pmhx htn, afib, chf, copd, here w/ L hip pain s/p fall while getting dressed. Patient renal function has worsened since admission

## 2018-02-06 NOTE — PROGRESS NOTE ADULT - PROVIDER SPECIALTY LIST ADULT
Cardiology
Hospitalist
Infectious Disease
Nephrology
Neurology
Hospitalist

## 2018-02-06 NOTE — PROGRESS NOTE ADULT - PROBLEM SELECTOR PLAN 1
Work up suggest Pre-renal  Renal function is improving  Hold IVF now in view of h/o CHF  Use lasix PRN  D/C chamorro's and trial of void

## 2018-02-06 NOTE — DISCHARGE NOTE ADULT - SECONDARY DIAGNOSIS.
Hypotension, unspecified hypotension type Acute on chronic renal insufficiency Chronic atrial fibrillation Hyponatremia Chronic obstructive pulmonary disease, unspecified COPD type Essential hypertension

## 2018-02-06 NOTE — PROGRESS NOTE ADULT - SUBJECTIVE AND OBJECTIVE BOX
Patient is a 88y old  Male who presents with a chief complaint of L hip pain (2018 16:41)      INTERVAL HPI/OVERNIGHT EVENTS:  T(C): 36.7 (18 @ 12:12), Max: 36.7 (18 @ 12:12)  HR: 87 (18 @ 12:12) (76 - 99)  BP: 133/63 (18 @ 12:12) (95/55 - 133/63)  RR: 18 (18 @ 12:12) (18 - 18)  SpO2: 100% (18 @ 12:12) (98% - 100%)  Wt(kg): --  I&O's Summary    2018 07:  -  2018 07:00  --------------------------------------------------------  IN: 760 mL / OUT: 1550 mL / NET: -790 mL    2018 07:01  -  2018 15:48  --------------------------------------------------------  IN: 1600 mL / OUT: 550 mL / NET: 1050 mL        LABS:                        8.6    6.29  )-----------( 233      ( 2018 07:12 )             25.1     -03    135  |  104  |  75<H>  ----------------------------<  92  4.3   |  16<L>  |  2.23<H>    Ca    8.3<L>      2018 07:14  Mg     1.4     03      PT/INR - ( 2018 07:32 )   PT: 52.0 sec;   INR: 4.46 ratio           Urinalysis Basic - ( 2018 00:05 )    Color: Yellow / Appearance: SL Turbid / S.016 / pH: x  Gluc: x / Ketone: Negative  / Bili: Negative / Urobili: Negative   Blood: x / Protein: 30 mg/dL / Nitrite: Negative   Leuk Esterase: Negative / RBC: Negative /HPF / WBC 0-2 /HPF   Sq Epi: x / Non Sq Epi: OCC /HPF / Bacteria: Negative /HPF      CAPILLARY BLOOD GLUCOSE            Urinalysis Basic - ( 2018 00:05 )    Color: Yellow / Appearance: SL Turbid / S.016 / pH: x  Gluc: x / Ketone: Negative  / Bili: Negative / Urobili: Negative   Blood: x / Protein: 30 mg/dL / Nitrite: Negative   Leuk Esterase: Negative / RBC: Negative /HPF / WBC 0-2 /HPF   Sq Epi: x / Non Sq Epi: OCC /HPF / Bacteria: Negative /HPF        MEDICATIONS  (STANDING):  ALBUTerol    90 MICROgram(s) HFA Inhaler 2 Puff(s) Inhalation every 6 hours  allopurinol 100 milliGRAM(s) Oral daily  atorvastatin 40 milliGRAM(s) Oral at bedtime  clopidogrel Tablet 75 milliGRAM(s) Oral daily  docusate sodium 100 milliGRAM(s) Oral three times a day  mesalamine DR Capsule 800 milliGRAM(s) Oral <User Schedule>  pantoprazole    Tablet 40 milliGRAM(s) Oral before breakfast  sodium chloride 0.9%. 1000 milliLiter(s) (75 mL/Hr) IV Continuous <Continuous>  tamsulosin 0.4 milliGRAM(s) Oral at bedtime  tiotropium 18 MICROgram(s) Capsule 1 Capsule(s) Inhalation daily    MEDICATIONS  (PRN):          PHYSICAL EXAM:  GENERAL: NAD, well-groomed, well-developed  HEAD:  Atraumatic, Normocephalic  CHEST/LUNG: Clear to percussion bilaterally; No rales, rhonchi, wheezing, or rubs  HEART: Regular rate and rhythm; No murmurs, rubs, or gallops  ABDOMEN: Soft, Nontender, Nondistended; Bowel sounds present  EXTREMITIES:  2+ Peripheral Pulses, No clubbing, cyanosis, or edema  LYMPH: No lymphadenopathy noted  SKIN: No rashes or lesions    Care Discussed with Consultants/Other Providers [ +] YES  [ ] NO
88y old  Male who presents with a chief complaint of L hip pain      Interval history:  Afebrile,  pain in rt heel, cough persists, no dizziness.       Antimicrobials:      REVIEW OF SYSTEMS:    No chest pain or palpitations  No SOB  No N/V/D, no abdominal pain  No rash.     Vital Signs Last 24 Hrs  T(C): 36.6 (02-05-18 @ 11:26), Max: 36.8 (02-04-18 @ 20:32)  T(F): 97.9 (02-05-18 @ 11:26), Max: 98.2 (02-04-18 @ 20:32)  HR: 100 (02-05-18 @ 11:26) (90 - 100)  BP: 113/66 (02-05-18 @ 11:26) (113/66 - 127/67)  RR: 21 (02-05-18 @ 11:26) (17 - 21)  SpO2: 100% (02-05-18 @ 11:26) (98% - 100%)    PHYSICAL EXAM:  Patient in no acute distress. Alert, oriented.   No icterus, no oral ulcers.  Cardiovascular: S1S2 normal, irregular, + murmur.   Lungs: + air entry B/L lung fields.  Gastrointestinal: soft, nontender, nondistended.  Extremities: no edema, rt heel with no tenderness on palpation, no warmth.   IV sites not inflamed.                           8.3    6.24  )-----------( 241      ( 05 Feb 2018 07:17 )             25.6   02-05    136  |  99  |  53<H>  ----------------------------<  91  4.7   |  22  |  1.49<H>    Ca    8.3<L>      05 Feb 2018 07:31      Culture - Blood (collected 02 Feb 2018 19:12)  Source: .Blood Blood-Peripheral  Preliminary Report (03 Feb 2018 20:01):    No growth to date.    Culture - Blood (collected 02 Feb 2018 19:12)  Source: .Blood Blood-Peripheral  Preliminary Report (03 Feb 2018 20:01):    No growth to date.
[facility]        - Patient seen and examined.  - Today, patients son jorgito is at bedside, who reports that pt is having R ankle pain today, which is new. no other complaints.          *****MEDICATIONS: Current medication reviewed and documented.    MEDICATIONS  (STANDING):  ALBUTerol    90 MICROgram(s) HFA Inhaler 2 Puff(s) Inhalation every 6 hours  allopurinol 100 milliGRAM(s) Oral daily  atorvastatin 40 milliGRAM(s) Oral at bedtime  clopidogrel Tablet 75 milliGRAM(s) Oral daily  docusate sodium 100 milliGRAM(s) Oral three times a day  mesalamine DR Capsule 800 milliGRAM(s) Oral <User Schedule>  pantoprazole    Tablet 40 milliGRAM(s) Oral before breakfast  sodium chloride 0.9%. 1000 milliLiter(s) (40 mL/Hr) IV Continuous <Continuous>  tamsulosin 0.4 milliGRAM(s) Oral at bedtime  tiotropium 18 MICROgram(s) Capsule 1 Capsule(s) Inhalation daily    MEDICATIONS  (PRN):           ***** REVIEW OF SYSTEM:  GEN: no fever, no chills, no pain  RESP: no SOB, no cough, no sputum  CVS: no chest pain, no palpitations, no edema  GI: no abdominal pain, no nausea, no vomiting, no constipation, no diarrhea  : no dysurea, no frequency  NEURO: no headache, no diziness  PSYCH: no depression, not anxious  Derm : no itching, no rash         ***** VITAL SIGNS: VSS           *****PHYSICAL EXAM:   alert oriented x 2 attention comprehension are fair.  Able to name, repeat.   EOmi fundi not visualized   no nystagmus VFF to confrontation  Tongue is midline  Palate elevates symmetrically   Moving all 4 ext spontaneously no drift appreciated    Gait not assessed.            *****LAB AND IMAGIN.9    9.7   )-----------( 184      ( 2018 06:12 )             28.6               02-01    133<L>  |  99  |  76<H>  ----------------------------<  99  4.1   |  20<L>  |  3.05<H>    Ca    8.1<L>      2018 06:12    TPro  6.2  /  Alb  2.9<L>  /  TBili  1.2  /  DBili  x   /  AST  34  /  ALT  12  /  AlkPhos  119      PT/INR - ( 2018 06:12 )   PT: 60.9 sec;   INR: 5.45 ratio         PTT - ( 2018 07:18 )  PTT:45.4 sec       CARDIAC MARKERS ( 2018 06:12 )  x     / x     / 622 U/L / x     / x      CARDIAC MARKERS ( 2018 15:44 )  x     / x     / 815 U/L / x     / x                   Normal EEG in the drowsy and asleep states.  [All pertinent recent Imaging/Reports reviewed]           *****A S S E S S M E N T   A N D   P L A N :        88M pmhx htn, afib, chf, copd, small left frontal infarct 3 years ago with mild expressive aphasia here w/ L hip pain s/p fall while getting dressed this morning. Per pt he does not remember the incident very well but does not believe he lost consciousness or hit his head. He is on bloodthinners (plavix and coumadin) for his aFib. Pt denies precipitating sx including cp or dizziness prior to the fall. At baseline pt is sob requiring 3 duonebs per day for his COPD. He is complaining of his usual sob today as well. Pt also denies recent f/c, ap/n/v/d, headaches, balance issues, or any other associated symptoms    Pt reportedly felt weak the night before, and went to bed early.  He does not recall the turn of events on the date of the admission. He was found on the floor unresponsive by the  at the [facility], he is unsure when he woke up and whether he had breakfast that morning. Check orthostatics  likely syncope due to renal insufficiency, management per medicine and renal.  eeg neg for sz.    will continue to follow.   pt consult   Thank you for allowing me to participate in the care of this patient. Please do not hesitate to call me if you have any  questions.        ________________  Mary Alice Santiago MD  [facility] ([facility]  [phone]     30 minutes spent on total encounter; more than 50 % of the visit was  spent counseling and or  coordinating care by the attending physician.   At the present time, [facility] does not  provide outpatient followup, best to call the your insurance to find a participating provider.  This was explained to you at the time of the visit. Alternatively, if your insurance allows it, you can follow up with a neurologist  Dr. Giuseppe Kenny [phone].
Dr. Dumont  Office [phone]  Cell [phone]  Katie MOORE  Cell [phone]      Patient is a 88y old  Male who presents with a chief complaint of L hip pain (30 Jan 2018 16:41)      Patient seen and examined at bedside. No chest pain/sob    VITALS:  T(F): 97.9 (02-05-18 @ 11:26), Max: 98.2 (02-04-18 @ 20:32)  HR: 100 (02-05-18 @ 11:26)  BP: 113/66 (02-05-18 @ 11:26)  RR: 21 (02-05-18 @ 11:26)  SpO2: 100% (02-05-18 @ 11:26)  Wt(kg): --    02-04 @ 07:01  -  02-05 @ 07:00  --------------------------------------------------------  IN: 1470 mL / OUT: 1400 mL / NET: 70 mL    02-05 @ 07:01  -  02-05 @ 17:25  --------------------------------------------------------  IN: 660 mL / OUT: 450 mL / NET: 210 mL          PHYSICAL EXAM:  Constitutional: NAD  Neck: No JVD  Respiratory: CTAB, no wheezes, rales or rhonchi  Cardiovascular: S1, S2, RRR  Gastrointestinal: BS+, soft, NT/ND  Extremities: No peripheral edema    Hospital Medications:   MEDICATIONS  (STANDING):  ALBUTerol    90 MICROgram(s) HFA Inhaler 2 Puff(s) Inhalation every 6 hours  allopurinol 100 milliGRAM(s) Oral daily  atorvastatin 40 milliGRAM(s) Oral at bedtime  clopidogrel Tablet 75 milliGRAM(s) Oral daily  docusate sodium 100 milliGRAM(s) Oral three times a day  mesalamine DR Capsule 800 milliGRAM(s) Oral <User Schedule>  pantoprazole    Tablet 40 milliGRAM(s) Oral before breakfast  sodium chloride 0.9%. 1000 milliLiter(s) (75 mL/Hr) IV Continuous <Continuous>  tamsulosin 0.4 milliGRAM(s) Oral at bedtime  tiotropium 18 MICROgram(s) Capsule 1 Capsule(s) Inhalation daily  warfarin 3 milliGRAM(s) Oral once      LABS:  02-05    136  |  99  |  53<H>  ----------------------------<  91  4.7   |  22  |  1.49<H>    Ca    8.3<L>      05 Feb 2018 07:31      Creatinine Trend: 1.49 <--, 1.77 <--, 1.92 <--, 2.23 <--, 2.70 <--, 3.21 <--, 3.05 <--, 1.78 <--, 2.26 <--                                8.3    6.24  )-----------( 241      ( 05 Feb 2018 07:17 )             25.6     Urine Studies:  Urinalysis - [02-02-18 @ 00:05]      Color Yellow / Appearance SL Turbid / SG 1.016 / pH 6.0      Gluc Negative / Ketone Negative  / Bili Negative / Urobili Negative       Blood Trace / Protein 30 / Leuk Est Negative / Nitrite Negative      RBC Negative / WBC 0-2 / Hyaline 2-5 / Gran  / Sq Epi  / Non Sq Epi OCC / Bacteria Negative    Urine Creatinine 111      [02-02-18 @ 20:49]  Urine Sodium <20      [02-02-18 @ 20:49]  Urine Urea Nitrogen 852      [02-02-18 @ 20:49]  Urine Osmolality 446      [02-02-18 @ 20:49]    TSH 0.55      [02-03-18 @ 07:14]        RADIOLOGY & ADDITIONAL STUDIES:
Dr. Dumont  Office [phone]  Cell [phone]  Katie MOORE  Cell [phone]      Patient is a 88y old  Male who presents with a chief complaint of L hip pain (30 Jan 2018 16:41)      Patient seen and examined at bedside. No chest pain/sob    VITALS:  T(F): 97.4 (02-06-18 @ 11:08), Max: 98.4 (02-05-18 @ 20:31)  HR: 94 (02-06-18 @ 11:08)  BP: 105/65 (02-06-18 @ 11:08)  RR: 18 (02-06-18 @ 11:08)  SpO2: 99% (02-06-18 @ 11:08)  Wt(kg): --    02-05 @ 07:01  -  02-06 @ 07:00  --------------------------------------------------------  IN: 1410 mL / OUT: 1200 mL / NET: 210 mL    02-06 @ 07:01  -  02-06 @ 12:47  --------------------------------------------------------  IN: 360 mL / OUT: 0 mL / NET: 360 mL          PHYSICAL EXAM:  Constitutional: NAD  Neck: No JVD  Respiratory: CTAB, no wheezes, rales or rhonchi  Cardiovascular: S1, S2, RRR  Gastrointestinal: BS+, soft, NT/ND  Extremities: No peripheral edema    Hospital Medications:   MEDICATIONS  (STANDING):  ALBUTerol    90 MICROgram(s) HFA Inhaler 2 Puff(s) Inhalation every 6 hours  allopurinol 100 milliGRAM(s) Oral daily  atorvastatin 40 milliGRAM(s) Oral at bedtime  clopidogrel Tablet 75 milliGRAM(s) Oral daily  docusate sodium 100 milliGRAM(s) Oral three times a day  mesalamine DR Capsule 800 milliGRAM(s) Oral <User Schedule>  pantoprazole    Tablet 40 milliGRAM(s) Oral before breakfast  tamsulosin 0.4 milliGRAM(s) Oral at bedtime  tiotropium 18 MICROgram(s) Capsule 1 Capsule(s) Inhalation daily      LABS:  02-06    136  |  101  |  48<H>  ----------------------------<  92  4.4   |  21<L>  |  1.36<H>    Ca    8.1<L>      06 Feb 2018 07:15      Creatinine Trend: 1.36 <--, 1.49 <--, 1.77 <--, 1.92 <--, 2.23 <--, 2.70 <--, 3.21 <--, 3.05 <--, 1.78 <--                                7.8    5.66  )-----------( 252      ( 06 Feb 2018 07:07 )             23.8     Urine Studies:  Urinalysis - [02-02-18 @ 00:05]      Color Yellow / Appearance SL Turbid / SG 1.016 / pH 6.0      Gluc Negative / Ketone Negative  / Bili Negative / Urobili Negative       Blood Trace / Protein 30 / Leuk Est Negative / Nitrite Negative      RBC Negative / WBC 0-2 / Hyaline 2-5 / Gran  / Sq Epi  / Non Sq Epi OCC / Bacteria Negative    Urine Creatinine 111      [02-02-18 @ 20:49]  Urine Sodium <20      [02-02-18 @ 20:49]  Urine Urea Nitrogen 852      [02-02-18 @ 20:49]  Urine Osmolality 446      [02-02-18 @ 20:49]    TSH 0.55      [02-03-18 @ 07:14]        RADIOLOGY & ADDITIONAL STUDIES:
Dr. Dumont  Office [phone]  Cell [phone]  Katie MOORE  Cell [phone]      Patient is a 88y old  Male who presents with a chief complaint of L hip pain (30 Jan 2018 16:41)      Patient seen and examined at bedside. No chest pain/sob    VITALS:  T(F): 98 (02-03-18 @ 12:12), Max: 98 (02-03-18 @ 12:12)  HR: 87 (02-03-18 @ 12:12)  BP: 133/63 (02-03-18 @ 12:12)  RR: 18 (02-03-18 @ 12:12)  SpO2: 100% (02-03-18 @ 12:12)  Wt(kg): --    02-02 @ 07:01  -  02-03 @ 07:00  --------------------------------------------------------  IN: 760 mL / OUT: 1550 mL / NET: -790 mL    02-03 @ 07:01  -  02-03 @ 18:52  --------------------------------------------------------  IN: 1600 mL / OUT: 550 mL / NET: 1050 mL          PHYSICAL EXAM:  Constitutional: NAD  Neck: No JVD  Respiratory: CTAB, no wheezes, rales or rhonchi  Cardiovascular: S1, S2, RRR  Gastrointestinal: BS+, soft, NT/ND  Extremities: No peripheral edema    Hospital Medications:   MEDICATIONS  (STANDING):  ALBUTerol    90 MICROgram(s) HFA Inhaler 2 Puff(s) Inhalation every 6 hours  allopurinol 100 milliGRAM(s) Oral daily  atorvastatin 40 milliGRAM(s) Oral at bedtime  clopidogrel Tablet 75 milliGRAM(s) Oral daily  docusate sodium 100 milliGRAM(s) Oral three times a day  mesalamine DR Capsule 800 milliGRAM(s) Oral <User Schedule>  pantoprazole    Tablet 40 milliGRAM(s) Oral before breakfast  sodium chloride 0.9%. 1000 milliLiter(s) (75 mL/Hr) IV Continuous <Continuous>  tamsulosin 0.4 milliGRAM(s) Oral at bedtime  tiotropium 18 MICROgram(s) Capsule 1 Capsule(s) Inhalation daily      LABS:  02-03    135  |  104  |  75<H>  ----------------------------<  92  4.3   |  16<L>  |  2.23<H>    Ca    8.3<L>      03 Feb 2018 07:14  Mg     1.4     02-03      Creatinine Trend: 2.23 <--, 2.70 <--, 3.21 <--, 3.05 <--, 1.78 <--, 2.26 <--                                8.6    6.29  )-----------( 233      ( 03 Feb 2018 07:12 )             25.1     Urine Studies:  Urinalysis - [02-02-18 @ 00:05]      Color Yellow / Appearance SL Turbid / SG 1.016 / pH 6.0      Gluc Negative / Ketone Negative  / Bili Negative / Urobili Negative       Blood Trace / Protein 30 / Leuk Est Negative / Nitrite Negative      RBC Negative / WBC 0-2 / Hyaline 2-5 / Gran  / Sq Epi  / Non Sq Epi OCC / Bacteria Negative    Urine Creatinine 111      [02-02-18 @ 20:49]  Urine Sodium <20      [02-02-18 @ 20:49]  Urine Urea Nitrogen 852      [02-02-18 @ 20:49]  Urine Osmolality 446      [02-02-18 @ 20:49]    TSH 0.55      [02-03-18 @ 07:14]        RADIOLOGY & ADDITIONAL STUDIES:
Dr. Dumont  Office [phone]  Cell [phone]  Katie MOORE  Cell [phone]      Patient is a 88y old  Male who presents with a chief complaint of L hip pain (30 Jan 2018 16:41)      Patient seen and examined at bedside. No chest pain/sob    VITALS:  T(F): 97.3 (02-04-18 @ 12:28), Max: 98.1 (02-04-18 @ 04:04)  HR: 86 (02-04-18 @ 12:28)  BP: 117/75 (02-04-18 @ 12:28)  RR: 18 (02-04-18 @ 12:28)  SpO2: 100% (02-04-18 @ 12:28)  Wt(kg): --    02-03 @ 07:01  -  02-04 @ 07:00  --------------------------------------------------------  IN: 2140 mL / OUT: 1800 mL / NET: 340 mL    02-04 @ 07:01  -  02-04 @ 17:13  --------------------------------------------------------  IN: 480 mL / OUT: 350 mL / NET: 130 mL          PHYSICAL EXAM:  Constitutional: NAD  Neck: No JVD  Respiratory: CTAB, no wheezes, rales or rhonchi  Cardiovascular: S1, S2, RRR  Gastrointestinal: BS+, soft, NT/ND  Extremities: No peripheral edema    Hospital Medications:   MEDICATIONS  (STANDING):  ALBUTerol    90 MICROgram(s) HFA Inhaler 2 Puff(s) Inhalation every 6 hours  allopurinol 100 milliGRAM(s) Oral daily  atorvastatin 40 milliGRAM(s) Oral at bedtime  clopidogrel Tablet 75 milliGRAM(s) Oral daily  docusate sodium 100 milliGRAM(s) Oral three times a day  mesalamine DR Capsule 800 milliGRAM(s) Oral <User Schedule>  pantoprazole    Tablet 40 milliGRAM(s) Oral before breakfast  sodium chloride 0.9%. 1000 milliLiter(s) (75 mL/Hr) IV Continuous <Continuous>  tamsulosin 0.4 milliGRAM(s) Oral at bedtime  tiotropium 18 MICROgram(s) Capsule 1 Capsule(s) Inhalation daily      LABS:  02-04    133<L>  |  103  |  67<H>  ----------------------------<  93  5.5<H>   |  19<L>  |  1.92<H>    Ca    8.4      04 Feb 2018 08:28  Mg     1.4     02-03      Creatinine Trend: 1.92 <--, 2.23 <--, 2.70 <--, 3.21 <--, 3.05 <--, 1.78 <--, 2.26 <--                                8.4    5.94  )-----------( 220      ( 04 Feb 2018 08:44 )             26.8     Urine Studies:  Urinalysis - [02-02-18 @ 00:05]      Color Yellow / Appearance SL Turbid / SG 1.016 / pH 6.0      Gluc Negative / Ketone Negative  / Bili Negative / Urobili Negative       Blood Trace / Protein 30 / Leuk Est Negative / Nitrite Negative      RBC Negative / WBC 0-2 / Hyaline 2-5 / Gran  / Sq Epi  / Non Sq Epi OCC / Bacteria Negative    Urine Creatinine 111      [02-02-18 @ 20:49]  Urine Sodium <20      [02-02-18 @ 20:49]  Urine Urea Nitrogen 852      [02-02-18 @ 20:49]  Urine Osmolality 446      [02-02-18 @ 20:49]    TSH 0.55      [02-03-18 @ 07:14]        RADIOLOGY & ADDITIONAL STUDIES:
Dr. Dumont  Office [phone]  Cell [phone]  Katie MOORE  Cell [phone]      Patient is a 88y old  Male who presents with a chief complaint of L hip pain (30 Jan 2018 16:41)      Patient seen and examined at bedside. No chest pain/sob    VITALS:  T(F): 98.1 (02-02-18 @ 11:39), Max: 98.1 (02-02-18 @ 11:39)  HR: 86 (02-02-18 @ 14:27)  BP: 84/40 (02-02-18 @ 14:27)  RR: 18 (02-02-18 @ 11:39)  SpO2: 95% (02-02-18 @ 11:39)  Wt(kg): --    02-01 @ 07:01  -  02-02 @ 07:00  --------------------------------------------------------  IN: 1530 mL / OUT: 1120 mL / NET: 410 mL    02-02 @ 07:01  -  02-02 @ 16:36  --------------------------------------------------------  IN: 600 mL / OUT: 350 mL / NET: 250 mL          PHYSICAL EXAM:  Constitutional: NAD  Neck: No JVD  Respiratory: CTAB, no wheezes, rales or rhonchi  Cardiovascular: S1, S2, RRR  Gastrointestinal: BS+, soft, NT/ND  Extremities: No peripheral edema    Hospital Medications:   MEDICATIONS  (STANDING):  ALBUTerol    90 MICROgram(s) HFA Inhaler 2 Puff(s) Inhalation every 6 hours  allopurinol 100 milliGRAM(s) Oral daily  atorvastatin 40 milliGRAM(s) Oral at bedtime  clopidogrel Tablet 75 milliGRAM(s) Oral daily  docusate sodium 100 milliGRAM(s) Oral three times a day  mesalamine DR Capsule 800 milliGRAM(s) Oral <User Schedule>  pantoprazole    Tablet 40 milliGRAM(s) Oral before breakfast  sodium chloride 0.9%. 1000 milliLiter(s) (40 mL/Hr) IV Continuous <Continuous>  sodium chloride 0.9%. 1000 milliLiter(s) (75 mL/Hr) IV Continuous <Continuous>  tamsulosin 0.4 milliGRAM(s) Oral at bedtime  tiotropium 18 MICROgram(s) Capsule 1 Capsule(s) Inhalation daily      LABS:  02-02    134<L>  |  101  |  92<H>  ----------------------------<  107<H>  4.1   |  18<L>  |  2.70<H>    Ca    7.5<L>      02 Feb 2018 08:29    TPro  6.2  /  Alb  2.9<L>  /  TBili  1.2  /  DBili      /  AST  34  /  ALT  12  /  AlkPhos  119  02-01    Creatinine Trend: 2.70 <--, 3.21 <--, 3.05 <--, 1.78 <--, 2.26 <--                                8.7    8.2   )-----------( 176      ( 02 Feb 2018 15:10 )             25.4     Urine Studies:  Urinalysis - [02-02-18 @ 00:05]      Color Yellow / Appearance SL Turbid / SG 1.016 / pH 6.0      Gluc Negative / Ketone Negative  / Bili Negative / Urobili Negative       Blood Trace / Protein 30 / Leuk Est Negative / Nitrite Negative      RBC Negative / WBC 0-2 / Hyaline 2-5 / Gran  / Sq Epi  / Non Sq Epi OCC / Bacteria Negative            RADIOLOGY & ADDITIONAL STUDIES:
[facility]        - Patient seen and examined.  - Today, patients  is by himself       *****MEDICATIONS: Current medication reviewed and documented.    MEDICATIONS  (STANDING):  ALBUTerol    90 MICROgram(s) HFA Inhaler 2 Puff(s) Inhalation every 6 hours  allopurinol 100 milliGRAM(s) Oral daily  atorvastatin 40 milliGRAM(s) Oral at bedtime  clopidogrel Tablet 75 milliGRAM(s) Oral daily  docusate sodium 100 milliGRAM(s) Oral three times a day  mesalamine DR Capsule 800 milliGRAM(s) Oral <User Schedule>  pantoprazole    Tablet 40 milliGRAM(s) Oral before breakfast  sodium chloride 0.9%. 1000 milliLiter(s) (40 mL/Hr) IV Continuous <Continuous>  tamsulosin 0.4 milliGRAM(s) Oral at bedtime  tiotropium 18 MICROgram(s) Capsule 1 Capsule(s) Inhalation daily    MEDICATIONS  (PRN):           ***** REVIEW OF SYSTEM:  GEN: no fever, no chills, no pain  RESP: no SOB, no cough, no sputum  CVS: no chest pain, no palpitations, no edema  GI: no abdominal pain, no nausea, no vomiting, no constipation, no diarrhea  : no dysurea, no frequency  NEURO: no headache, no diziness  PSYCH: no depression, not anxious  Derm : no itching, no rash         ***** VITAL SIGNS: VSS           *****PHYSICAL EXAM:   alert oriented x 2 attention comprehension are fair.  Able to name, repeat.   EOmi fundi not visualized   no nystagmus VFF to confrontation  Tongue is midline  Palate elevates symmetrically   Moving all 4 ext spontaneously no drift appreciated    Gait not assessed.            *****LAB AND IMAGIN.9    9.7   )-----------( 184      ( 2018 06:12 )             28.6               02-01    133<L>  |  99  |  76<H>  ----------------------------<  99  4.1   |  20<L>  |  3.05<H>    Ca    8.1<L>      2018 06:12    TPro  6.2  /  Alb  2.9<L>  /  TBili  1.2  /  DBili  x   /  AST  34  /  ALT  12  /  AlkPhos  119      PT/INR - ( 2018 06:12 )   PT: 60.9 sec;   INR: 5.45 ratio         PTT - ( 2018 07:18 )  PTT:45.4 sec       CARDIAC MARKERS ( 2018 06:12 )  x     / x     / 622 U/L / x     / x      CARDIAC MARKERS ( 2018 15:44 )  x     / x     / 815 U/L / x     / x                   Normal EEG in the drowsy and asleep states.  [All pertinent recent Imaging/Reports reviewed]           *****A S S E S S M E N T   A N D   P L A N :        88M pmhx htn, afib, chf, copd, small left frontal infarct 3 years ago with mild expressive aphasia here w/ L hip pain s/p fall while getting dressed this morning. Per pt he does not remember the incident very well but does not believe he lost consciousness or hit his head. He is on bloodthinners (plavix and coumadin) for his aFib. Pt denies precipitating sx including cp or dizziness prior to the fall. At baseline pt is sob requiring 3 duonebs per day for his COPD. He is complaining of his usual sob today as well. Pt also denies recent f/c, ap/n/v/d, headaches, balance issues, or any other associated symptoms    Pt reportedly felt weak the night before, and went to bed early.  He does not recall the turn of events on the date of the admission. He was found on the floor unresponsive by the  at the [facility], he is unsure when he woke up and whether he had breakfast that morning. Check orthostatics  likely syncope due to renal insufficiency, management per medicine and renal.  eeg neg for sz.    will continue to follow.   pt consult   Thank you for allowing me to participate in the care of this patient. Please do not hesitate to call me if you have any  questions.        ________________  Mary Alice Santiago MD  [facility] ([facility]  [phone]     30 minutes spent on total encounter; more than 50 % of the visit was  spent counseling and or  coordinating care by the attending physician.   At the present time, [facility] does not  provide outpatient followup, best to call the your insurance to find a participating provider.  This was explained to you at the time of the visit. Alternatively, if your insurance allows it, you can follow up with a neurologist  Dr. Giuseppe Kenny [phone].
Lazaro Ramirez MD  Interventional Cardiology  [place] Office : [address] [place] [address] [address]. [address]  Tel:   [place] Office : [address] [facility] [place] [address]  Tel: [phone]  Cell : [phone]    Subjective : Pt lying in bed comfortable, not in distress, denies any chest pain or SOB  	  MEDICATIONS:  clopidogrel Tablet 75 milliGRAM(s) Oral daily  tamsulosin 0.4 milliGRAM(s) Oral at bedtime  warfarin 3 milliGRAM(s) Oral once      ALBUTerol    90 MICROgram(s) HFA Inhaler 2 Puff(s) Inhalation every 6 hours  ALBUTerol/ipratropium for Nebulization 3 milliLiter(s) Nebulizer every 6 hours PRN  tiotropium 18 MICROgram(s) Capsule 1 Capsule(s) Inhalation daily      docusate sodium 100 milliGRAM(s) Oral three times a day  mesalamine DR Capsule 800 milliGRAM(s) Oral <User Schedule>  pantoprazole    Tablet 40 milliGRAM(s) Oral before breakfast    allopurinol 100 milliGRAM(s) Oral daily  atorvastatin 40 milliGRAM(s) Oral at bedtime    sodium chloride 0.9%. 1000 milliLiter(s) IV Continuous <Continuous>      PHYSICAL EXAM:  T(C): 36.9 (02-05-18 @ 20:31), Max: 36.9 (02-05-18 @ 20:31)  HR: 103 (02-05-18 @ 20:31) (99 - 103)  BP: 133/70 (02-05-18 @ 20:31) (113/66 - 133/70)  RR: 20 (02-05-18 @ 20:31) (17 - 21)  SpO2: 99% (02-05-18 @ 20:31) (99% - 100%)  Wt(kg): --  I&O's Summary    04 Feb 2018 07:01  -  05 Feb 2018 07:00  --------------------------------------------------------  IN: 1470 mL / OUT: 1400 mL / NET: 70 mL    05 Feb 2018 07:01  -  05 Feb 2018 21:39  --------------------------------------------------------  IN: 1020 mL / OUT: 450 mL / NET: 570 mL          Appearance: Normal	  HEENT:   Normal oral mucosa, PERRL, EOMI	  Cardiovascular: Normal S1 S2, No JVD, No murmurs, No edema  Respiratory: Lungs clear to auscultation	  Gastrointestinal:  Soft, Non-tender, + BS	  Extremities: Normal range of motion, No clubbing, cyanosis or edema                                    8.3    6.24  )-----------( 241      ( 05 Feb 2018 07:17 )             25.6     02-05    136  |  99  |  53<H>  ----------------------------<  91  4.7   |  22  |  1.49<H>    Ca    8.3<L>      05 Feb 2018 07:31      proBNP:   Lipid Profile:   HgA1c:   TSH:
Lazaro Ramirez MD  Interventional Cardiology  [place] Office : [address] [place] [address] [address]. [address]  Tel:   [place] Office : [address] [facility] [place] [address]  Tel: [phone]  Cell : [phone]    Subjective : Pt lying in bed comfortable, not in distress, denies any chest pain or SOB  	  MEDICATIONS:  clopidogrel Tablet 75 milliGRAM(s) Oral daily  tamsulosin 0.4 milliGRAM(s) Oral at bedtime      ALBUTerol    90 MICROgram(s) HFA Inhaler 2 Puff(s) Inhalation every 6 hours  tiotropium 18 MICROgram(s) Capsule 1 Capsule(s) Inhalation daily      docusate sodium 100 milliGRAM(s) Oral three times a day  mesalamine DR Capsule 800 milliGRAM(s) Oral <User Schedule>  pantoprazole    Tablet 40 milliGRAM(s) Oral before breakfast    allopurinol 100 milliGRAM(s) Oral daily  atorvastatin 40 milliGRAM(s) Oral at bedtime    sodium chloride 0.9%. 1000 milliLiter(s) IV Continuous <Continuous>      PHYSICAL EXAM:  T(C): 36.7 (02-03-18 @ 12:12), Max: 36.7 (02-03-18 @ 12:12)  HR: 87 (02-03-18 @ 12:12) (76 - 99)  BP: 133/63 (02-03-18 @ 12:12) (95/55 - 133/63)  RR: 18 (02-03-18 @ 12:12) (18 - 18)  SpO2: 100% (02-03-18 @ 12:12) (98% - 100%)  Wt(kg): --  I&O's Summary    02 Feb 2018 07:01  -  03 Feb 2018 07:00  --------------------------------------------------------  IN: 760 mL / OUT: 1550 mL / NET: -790 mL    03 Feb 2018 07:01  -  03 Feb 2018 17:41  --------------------------------------------------------  IN: 1600 mL / OUT: 550 mL / NET: 1050 mL          Appearance: Normal	  HEENT:   Normal oral mucosa, PERRL, EOMI	  Cardiovascular: Normal S1 S2, No JVD, No murmurs, No edema  Respiratory: Lungs clear to auscultation	  Gastrointestinal:  Soft, Non-tender, + BS	  Extremities: Normal range of motion, No clubbing, cyanosis or edema                                    8.6    6.29  )-----------( 233      ( 03 Feb 2018 07:12 )             25.1     02-03    135  |  104  |  75<H>  ----------------------------<  92  4.3   |  16<L>  |  2.23<H>    Ca    8.3<L>      03 Feb 2018 07:14  Mg     1.4     02-03      proBNP:   Lipid Profile:   HgA1c:   TSH: Thyroid Stimulating Hormone, Serum: 0.55 uIU/mL (02-03 @ 07:14)
Lazaro Ramirez MD  Interventional Cardiology  [place] Office : [address] [place] [address]  Tel:   [place] Office : [address] [facility] [place] [address]  Tel: [phone]  Cell : [phone]    Subjective : Pt lying in bed comfortable, not in distress, denies any chest pain or SOB, slightly lethargic  	  MEDICATIONS:  clopidogrel Tablet 75 milliGRAM(s) Oral daily  tamsulosin 0.4 milliGRAM(s) Oral at bedtime      ALBUTerol    90 MICROgram(s) HFA Inhaler 2 Puff(s) Inhalation every 6 hours  tiotropium 18 MICROgram(s) Capsule 1 Capsule(s) Inhalation daily      docusate sodium 100 milliGRAM(s) Oral three times a day  mesalamine DR Capsule 800 milliGRAM(s) Oral <User Schedule>  pantoprazole    Tablet 40 milliGRAM(s) Oral before breakfast    allopurinol 100 milliGRAM(s) Oral daily  atorvastatin 40 milliGRAM(s) Oral at bedtime    sodium chloride 0.9%. 1000 milliLiter(s) IV Continuous <Continuous>  sodium chloride 0.9%. 1000 milliLiter(s) IV Continuous <Continuous>      PHYSICAL EXAM:  T(C): 36.7 (02-02-18 @ 11:39), Max: 36.7 (02-02-18 @ 11:39)  HR: 86 (02-02-18 @ 14:27) (84 - 93)  BP: 84/40 (02-02-18 @ 14:27) (76/40 - 111/61)  RR: 18 (02-02-18 @ 11:39) (18 - 20)  SpO2: 95% (02-02-18 @ 11:39) (95% - 99%)  Wt(kg): --  I&O's Summary    01 Feb 2018 07:01  -  02 Feb 2018 07:00  --------------------------------------------------------  IN: 1530 mL / OUT: 1120 mL / NET: 410 mL    02 Feb 2018 07:01  -  02 Feb 2018 15:52  --------------------------------------------------------  IN: 600 mL / OUT: 350 mL / NET: 250 mL          Appearance: Normal	  HEENT:   Normal oral mucosa, PERRL, EOMI	  Cardiovascular: Normal S1 S2, No JVD, No murmurs, No edema  Respiratory: Lungs clear to auscultation	  Gastrointestinal:  Soft, Non-tender, + BS	  Extremities: no edema                                    8.7    8.2   )-----------( 176      ( 02 Feb 2018 15:10 )             25.4     02-02    134<L>  |  101  |  92<H>  ----------------------------<  107<H>  4.1   |  18<L>  |  2.70<H>    Ca    7.5<L>      02 Feb 2018 08:29    TPro  6.2  /  Alb  2.9<L>  /  TBili  1.2  /  DBili  x   /  AST  34  /  ALT  12  /  AlkPhos  119  02-01    proBNP:   Lipid Profile:   HgA1c:   TSH:
Lazaro Ramirez MD  Interventional Cardiology  [place] Office : [address] [place] [address]. [address]  Tel:   [place] Office : [address] [facility] [place] [address]  Tel: [phone]  Cell : [phone]    Subjective : Pt lying in bed comfortable, not in distress, denies any chest pain or SOB  	  MEDICATIONS:  clopidogrel Tablet 75 milliGRAM(s) Oral daily  tamsulosin 0.4 milliGRAM(s) Oral at bedtime  ALBUTerol    90 MICROgram(s) HFA Inhaler 2 Puff(s) Inhalation every 6 hours  tiotropium 18 MICROgram(s) Capsule 1 Capsule(s) Inhalation daily  docusate sodium 100 milliGRAM(s) Oral three times a day  mesalamine DR Capsule 800 milliGRAM(s) Oral <User Schedule>  pantoprazole    Tablet 40 milliGRAM(s) Oral before breakfast    allopurinol 100 milliGRAM(s) Oral daily  atorvastatin 40 milliGRAM(s) Oral at bedtime    sodium chloride 0.9%. 1000 milliLiter(s) IV Continuous <Continuous>      PHYSICAL EXAM:  T(C): 36.3 (02-04-18 @ 12:28), Max: 36.7 (02-04-18 @ 04:04)  HR: 86 (02-04-18 @ 12:28) (86 - 95)  BP: 117/75 (02-04-18 @ 12:28) (117/75 - 121/66)  RR: 18 (02-04-18 @ 12:28) (18 - 18)  SpO2: 100% (02-04-18 @ 12:28) (98% - 100%)  Wt(kg): --  I&O's Summary    03 Feb 2018 07:01  -  04 Feb 2018 07:00  --------------------------------------------------------  IN: 2140 mL / OUT: 1800 mL / NET: 340 mL          Appearance: Normal	  HEENT:   Normal oral mucosa, PERRL, EOMI	  Cardiovascular: Normal S1 S2, No JVD, No murmurs, No edema  Respiratory: Lungs clear to auscultation	  Gastrointestinal:  Soft, Non-tender, + BS	  Extremities: Normal range of motion, No clubbing, cyanosis or edema                                    8.4    5.94  )-----------( 220      ( 04 Feb 2018 08:44 )             26.8     02-04    133<L>  |  103  |  67<H>  ----------------------------<  93  5.5<H>   |  19<L>  |  1.92<H>    Ca    8.4      04 Feb 2018 08:28  Mg     1.4     02-03      proBNP:   Lipid Profile:   HgA1c:   TSH:
Lazaro Ramirez MD  Interventional Cardiology  [place] Office : [address] [place] [address] [address]. [address]  Tel:   [place] Office : [address] [facility] [place] [address]  Tel: [phone]  Cell : [phone]    Subjective : Pt lying in bed comfortable, not in distress, denies any chest pain or SOB  	  MEDICATIONS:  clopidogrel Tablet 75 milliGRAM(s) Oral daily  tamsulosin 0.4 milliGRAM(s) Oral at bedtime  ALBUTerol    90 MICROgram(s) HFA Inhaler 2 Puff(s) Inhalation every 6 hours  tiotropium 18 MICROgram(s) Capsule 1 Capsule(s) Inhalation daily  docusate sodium 100 milliGRAM(s) Oral three times a day  mesalamine DR Capsule 800 milliGRAM(s) Oral <User Schedule>  pantoprazole    Tablet 40 milliGRAM(s) Oral before breakfast  allopurinol 100 milliGRAM(s) Oral daily  atorvastatin 40 milliGRAM(s) Oral at bedtime    sodium chloride 0.9%. 1000 milliLiter(s) IV Continuous <Continuous>      PHYSICAL EXAM:  T(C): 36.5 (02-01-18 @ 11:39), Max: 36.9 (01-31-18 @ 21:43)  HR: 91 (02-01-18 @ 16:23) (76 - 101)  BP: 98/55 (02-01-18 @ 16:23) (66/38 - 98/55)  RR: 16 (02-01-18 @ 11:39) (16 - 20)  SpO2: 97% (02-01-18 @ 11:39) (97% - 99%)  Wt(kg): --  I&O's Summary    31 Jan 2018 07:01  -  01 Feb 2018 07:00  --------------------------------------------------------  IN: 1200 mL / OUT: 400 mL / NET: 800 mL    01 Feb 2018 07:01  -  01 Feb 2018 20:54  --------------------------------------------------------  IN: 680 mL / OUT: 450 mL / NET: 230 mL          Appearance: Normal	  HEENT:   Normal oral mucosa, PERRL, EOMI	  Cardiovascular: Normal S1 S2, No JVD, No murmurs, No edema  Respiratory: Lungs clear to auscultation	  Gastrointestinal:  Soft, Non-tender, + BS	  Extremities: Normal range of motion, No clubbing, cyanosis or edema                                    9.9    9.7   )-----------( 184      ( 01 Feb 2018 06:12 )             28.6     02-01    133<L>  |  99  |  76<H>  ----------------------------<  99  4.1   |  20<L>  |  3.05<H>    Ca    8.1<L>      01 Feb 2018 06:12    TPro  6.2  /  Alb  2.9<L>  /  TBili  1.2  /  DBili  x   /  AST  34  /  ALT  12  /  AlkPhos  119  02-01    proBNP:   Lipid Profile:   HgA1c:   TSH:
Patient is a 88y old  Male who presents with a chief complaint of L hip pain (2018 16:41)  feels weak, nad, running low BP      INTERVAL HPI/OVERNIGHT EVENTS:  T(C): 36.7 (18 @ 11:39), Max: 36.7 (18 @ 11:39)  HR: 86 (18 @ 14:27) (84 - 93)  BP: 84/40 (18 @ 14:27) (76/40 - 111/61)  RR: 18 (18 @ 11:39) (18 - 20)  SpO2: 95% (18 @ 11:39) (95% - 99%)  Wt(kg): --  I&O's Summary    2018 07:01  -  2018 07:00  --------------------------------------------------------  IN: 1530 mL / OUT: 1120 mL / NET: 410 mL    2018 07:01  -  2018 17:01  --------------------------------------------------------  IN: 600 mL / OUT: 350 mL / NET: 250 mL        LABS:                        8.7    8.2   )-----------( 176      ( 2018 15:10 )             25.4         134<L>  |  101  |  92<H>  ----------------------------<  107<H>  4.1   |  18<L>  |  2.70<H>    Ca    7.5<L>      2018 08:29    TPro  6.2  /  Alb  2.9<L>  /  TBili  1.2  /  DBili  x   /  AST  34  /  ALT  12  /  AlkPhos  119  02-01    PT/INR - ( 2018 07:32 )   PT: 52.0 sec;   INR: 4.46 ratio           Urinalysis Basic - ( 2018 00:05 )    Color: Yellow / Appearance: SL Turbid / S.016 / pH: x  Gluc: x / Ketone: Negative  / Bili: Negative / Urobili: Negative   Blood: x / Protein: 30 mg/dL / Nitrite: Negative   Leuk Esterase: Negative / RBC: Negative /HPF / WBC 0-2 /HPF   Sq Epi: x / Non Sq Epi: OCC /HPF / Bacteria: Negative /HPF      CAPILLARY BLOOD GLUCOSE            Urinalysis Basic - ( 2018 00:05 )    Color: Yellow / Appearance: SL Turbid / S.016 / pH: x  Gluc: x / Ketone: Negative  / Bili: Negative / Urobili: Negative   Blood: x / Protein: 30 mg/dL / Nitrite: Negative   Leuk Esterase: Negative / RBC: Negative /HPF / WBC 0-2 /HPF   Sq Epi: x / Non Sq Epi: OCC /HPF / Bacteria: Negative /HPF        MEDICATIONS  (STANDING):  ALBUTerol    90 MICROgram(s) HFA Inhaler 2 Puff(s) Inhalation every 6 hours  allopurinol 100 milliGRAM(s) Oral daily  atorvastatin 40 milliGRAM(s) Oral at bedtime  clopidogrel Tablet 75 milliGRAM(s) Oral daily  docusate sodium 100 milliGRAM(s) Oral three times a day  mesalamine DR Capsule 800 milliGRAM(s) Oral <User Schedule>  pantoprazole    Tablet 40 milliGRAM(s) Oral before breakfast  sodium chloride 0.9%. 1000 milliLiter(s) (40 mL/Hr) IV Continuous <Continuous>  sodium chloride 0.9%. 1000 milliLiter(s) (75 mL/Hr) IV Continuous <Continuous>  tamsulosin 0.4 milliGRAM(s) Oral at bedtime  tiotropium 18 MICROgram(s) Capsule 1 Capsule(s) Inhalation daily    MEDICATIONS  (PRN):          PHYSICAL EXAM:  GENERAL: NAD, well-groomed, well-developed  HEAD:  Atraumatic, Normocephalic  CHEST/LUNG: Clear to percussion bilaterally; No rales, rhonchi, wheezing, or rubs  HEART: Regular rate and rhythm; No murmurs, rubs, or gallops  ABDOMEN: Soft, Nontender, Nondistended; Bowel sounds present  EXTREMITIES:  2+ Peripheral Pulses, No clubbing, cyanosis, or edema  LYMPH: No lymphadenopathy noted  SKIN: No rashes or lesions    Care Discussed with Consultants/Other Providers [ ] YES  [ ] NO
Patient is a 88y old  Male who presents with a chief complaint of L hip pain (30 Jan 2018 16:41)      INTERVAL HPI/OVERNIGHT EVENTS:  T(C): 36.8 (02-01-18 @ 04:02), Max: 36.9 (01-31-18 @ 21:43)  HR: 94 (02-01-18 @ 08:18) (94 - 101)  BP: 91/55 (02-01-18 @ 08:18) (90/49 - 91/55)  RR: 19 (02-01-18 @ 04:02) (19 - 20)  SpO2: 99% (02-01-18 @ 04:02) (99% - 99%)  Wt(kg): --  I&O's Summary    31 Jan 2018 07:01  -  01 Feb 2018 07:00  --------------------------------------------------------  IN: 1200 mL / OUT: 400 mL / NET: 800 mL    01 Feb 2018 07:01  -  01 Feb 2018 11:25  --------------------------------------------------------  IN: 0 mL / OUT: 350 mL / NET: -350 mL        LABS:                        9.9    9.7   )-----------( 184      ( 01 Feb 2018 06:12 )             28.6     02-01    133<L>  |  99  |  76<H>  ----------------------------<  99  4.1   |  20<L>  |  3.05<H>    Ca    8.1<L>      01 Feb 2018 06:12    TPro  6.2  /  Alb  2.9<L>  /  TBili  1.2  /  DBili  x   /  AST  34  /  ALT  12  /  AlkPhos  119  02-01    PT/INR - ( 01 Feb 2018 06:12 )   PT: 60.9 sec;   INR: 5.45 ratio         PTT - ( 31 Jan 2018 07:18 )  PTT:45.4 sec    CAPILLARY BLOOD GLUCOSE                MEDICATIONS  (STANDING):  ALBUTerol    90 MICROgram(s) HFA Inhaler 2 Puff(s) Inhalation every 6 hours  allopurinol 100 milliGRAM(s) Oral daily  atorvastatin 40 milliGRAM(s) Oral at bedtime  clopidogrel Tablet 75 milliGRAM(s) Oral daily  docusate sodium 100 milliGRAM(s) Oral three times a day  mesalamine DR Capsule 800 milliGRAM(s) Oral <User Schedule>  pantoprazole    Tablet 40 milliGRAM(s) Oral before breakfast  tamsulosin 0.4 milliGRAM(s) Oral at bedtime  tiotropium 18 MICROgram(s) Capsule 1 Capsule(s) Inhalation daily    MEDICATIONS  (PRN):          PHYSICAL EXAM:  GENERAL: NAD, well-groomed, well-developed  HEAD:  Atraumatic, Normocephalic  CHEST/LUNG: Clear to percussion bilaterally; No rales, rhonchi, wheezing, or rubs  HEART: Regular rate and rhythm; No murmurs, rubs, or gallops  ABDOMEN: Soft, Nontender, Nondistended; Bowel sounds present  EXTREMITIES:  2+ Peripheral Pulses, No clubbing, cyanosis, or edema  LYMPH: No lymphadenopathy noted  SKIN: No rashes or lesions    Care Discussed with Consultants/Other Providers [ ] YES  [ ] NO
Patient is a 88y old  Male who presents with a chief complaint of L hip pain (30 Jan 2018 16:41)  NAD      INTERVAL HPI/OVERNIGHT EVENTS:  T(C): 36.3 (02-04-18 @ 12:28), Max: 36.7 (02-04-18 @ 04:04)  HR: 86 (02-04-18 @ 12:28) (86 - 95)  BP: 117/75 (02-04-18 @ 12:28) (117/75 - 121/66)  RR: 18 (02-04-18 @ 12:28) (18 - 18)  SpO2: 100% (02-04-18 @ 12:28) (98% - 100%)  Wt(kg): --  I&O's Summary    03 Feb 2018 07:01  -  04 Feb 2018 07:00  --------------------------------------------------------  IN: 2140 mL / OUT: 1800 mL / NET: 340 mL    04 Feb 2018 07:01  -  04 Feb 2018 16:58  --------------------------------------------------------  IN: 480 mL / OUT: 350 mL / NET: 130 mL        LABS:                        8.4    5.94  )-----------( 220      ( 04 Feb 2018 08:44 )             26.8     02-04    133<L>  |  103  |  67<H>  ----------------------------<  93  5.5<H>   |  19<L>  |  1.92<H>    Ca    8.4      04 Feb 2018 08:28  Mg     1.4     02-03      PT/INR - ( 04 Feb 2018 08:44 )   PT: 29.2 sec;   INR: 2.54 ratio             CAPILLARY BLOOD GLUCOSE                MEDICATIONS  (STANDING):  ALBUTerol    90 MICROgram(s) HFA Inhaler 2 Puff(s) Inhalation every 6 hours  allopurinol 100 milliGRAM(s) Oral daily  atorvastatin 40 milliGRAM(s) Oral at bedtime  clopidogrel Tablet 75 milliGRAM(s) Oral daily  docusate sodium 100 milliGRAM(s) Oral three times a day  mesalamine DR Capsule 800 milliGRAM(s) Oral <User Schedule>  pantoprazole    Tablet 40 milliGRAM(s) Oral before breakfast  sodium chloride 0.9%. 1000 milliLiter(s) (75 mL/Hr) IV Continuous <Continuous>  tamsulosin 0.4 milliGRAM(s) Oral at bedtime  tiotropium 18 MICROgram(s) Capsule 1 Capsule(s) Inhalation daily    MEDICATIONS  (PRN):          PHYSICAL EXAM:  GENERAL: NAD, well-groomed, well-developed  HEAD:  Atraumatic, Normocephalic  CHEST/LUNG: Clear to percussion bilaterally; No rales, rhonchi, wheezing, or rubs  HEART: Regular rate and rhythm; No murmurs, rubs, or gallops  ABDOMEN: Soft, Nontender, Nondistended; Bowel sounds present  EXTREMITIES:  2+ Peripheral Pulses, No clubbing, cyanosis, or edema  LYMPH: No lymphadenopathy noted  SKIN: No rashes or lesions    Care Discussed with Consultants/Other Providers [ ] YES  [ ] NO
Patient is a 88y old  Male who presents with a chief complaint of L hip pain (30 Jan 2018 16:41)  NAD      INTERVAL HPI/OVERNIGHT EVENTS:  T(C): 36.9 (01-31-18 @ 04:15), Max: 37.4 (01-30-18 @ 09:54)  HR: 96 (01-31-18 @ 04:15) (87 - 102)  BP: 127/68 (01-31-18 @ 04:15) (105/46 - 131/68)  RR: 18 (01-31-18 @ 04:15) (16 - 20)  SpO2: 98% (01-31-18 @ 04:15) (96% - 100%)  Wt(kg): --  I&O's Summary    30 Jan 2018 07:01  -  31 Jan 2018 07:00  --------------------------------------------------------  IN: 945 mL / OUT: 350 mL / NET: 595 mL        LABS:                        9.7    14.15 )-----------( 246      ( 31 Jan 2018 07:18 )             29.5     01-30    139  |  101  |  57<H>  ----------------------------<  126<H>  4.4   |  20<L>  |  2.26<H>    Ca    8.9      30 Jan 2018 10:29    TPro  7.4  /  Alb  3.8  /  TBili  2.4<H>  /  DBili  x   /  AST  28  /  ALT  8<L>  /  AlkPhos  141<H>  01-30    PT/INR - ( 31 Jan 2018 07:18 )   PT: 52.2 sec;   INR: 4.48 ratio         PTT - ( 31 Jan 2018 07:18 )  PTT:45.4 sec    CAPILLARY BLOOD GLUCOSE                MEDICATIONS  (STANDING):  ALBUTerol    90 MICROgram(s) HFA Inhaler 2 Puff(s) Inhalation every 6 hours  allopurinol 100 milliGRAM(s) Oral daily  atorvastatin 40 milliGRAM(s) Oral at bedtime  clopidogrel Tablet 75 milliGRAM(s) Oral daily  docusate sodium 100 milliGRAM(s) Oral three times a day  furosemide    Tablet 40 milliGRAM(s) Oral daily  metoprolol succinate ER 50 milliGRAM(s) Oral daily  pantoprazole    Tablet 40 milliGRAM(s) Oral before breakfast  sacubitril 49 mG/valsartan 51 mG 1 Tablet(s) Oral two times a day  tiotropium 18 MICROgram(s) Capsule 1 Capsule(s) Inhalation daily    MEDICATIONS  (PRN):          PHYSICAL EXAM:  GENERAL: NAD, well-groomed, well-developed  HEAD:  Atraumatic, Normocephalic  CHEST/LUNG: Clear to percussion bilaterally; No rales, rhonchi, wheezing, or rubs  HEART: Regular rate and rhythm; No murmurs, rubs, or gallops  ABDOMEN: Soft, Nontender, Nondistended; Bowel sounds present  EXTREMITIES:  2+ Peripheral Pulses, No clubbing, cyanosis, or edema  LYMPH: No lymphadenopathy noted  SKIN: No rashes or lesions    Care Discussed with Consultants/Other Providers [ +] YES  [ ] NO
Patient is a 88y old  Male who presents with a chief complaint of L hip pain s/p fall (06 Feb 2018 13:31)      INTERVAL HPI/OVERNIGHT EVENTS:  T(C): 36.3 (02-06-18 @ 11:08), Max: 36.9 (02-05-18 @ 20:31)  HR: 104 (02-06-18 @ 13:52) (94 - 104)  BP: 121/68 (02-06-18 @ 13:52) (105/65 - 133/70)  RR: 18 (02-06-18 @ 13:52) (18 - 20)  SpO2: 96% (02-06-18 @ 13:52) (96% - 99%)  Wt(kg): --  I&O's Summary    05 Feb 2018 07:01  -  06 Feb 2018 07:00  --------------------------------------------------------  IN: 1410 mL / OUT: 1200 mL / NET: 210 mL    06 Feb 2018 07:01  -  06 Feb 2018 18:15  --------------------------------------------------------  IN: 1060 mL / OUT: 850 mL / NET: 210 mL        LABS:                        9.6    6.6   )-----------( 259      ( 06 Feb 2018 13:39 )             27.3     02-06    136  |  101  |  48<H>  ----------------------------<  92  4.4   |  21<L>  |  1.36<H>    Ca    8.1<L>      06 Feb 2018 07:15      PT/INR - ( 06 Feb 2018 07:07 )   PT: 22.1 sec;   INR: 1.93 ratio             CAPILLARY BLOOD GLUCOSE                MEDICATIONS  (STANDING):  ALBUTerol    90 MICROgram(s) HFA Inhaler 2 Puff(s) Inhalation every 6 hours  allopurinol 100 milliGRAM(s) Oral daily  atorvastatin 40 milliGRAM(s) Oral at bedtime  clopidogrel Tablet 75 milliGRAM(s) Oral daily  docusate sodium 100 milliGRAM(s) Oral three times a day  mesalamine DR Capsule 800 milliGRAM(s) Oral <User Schedule>  pantoprazole    Tablet 40 milliGRAM(s) Oral before breakfast  tamsulosin 0.4 milliGRAM(s) Oral at bedtime  tiotropium 18 MICROgram(s) Capsule 1 Capsule(s) Inhalation daily    MEDICATIONS  (PRN):  ALBUTerol/ipratropium for Nebulization 3 milliLiter(s) Nebulizer every 6 hours PRN Wheezing          PHYSICAL EXAM:  GENERAL: NAD, well-groomed, well-developed  HEAD:  Atraumatic, Normocephalic  CHEST/LUNG: Clear to percussion bilaterally; No rales, rhonchi, wheezing, or rubs  HEART: Regular rate and rhythm; No murmurs, rubs, or gallops  ABDOMEN: Soft, Nontender, Nondistended; Bowel sounds present  EXTREMITIES:  2+ Peripheral Pulses, No clubbing, cyanosis, or edema  LYMPH: No lymphadenopathy noted  SKIN: No rashes or lesions    Care Discussed with Consultants/Other Providers [+ ] YES  [ ] NO
[facility]        - Patient seen and examined.  - Today, patients son jorgito is at bedside, who reports that pt is having R ankle pain today, which is new. no other complaints.          *****MEDICATIONS: Current medication reviewed and documented.    MEDICATIONS  (STANDING):  ALBUTerol    90 MICROgram(s) HFA Inhaler 2 Puff(s) Inhalation every 6 hours  allopurinol 100 milliGRAM(s) Oral daily  atorvastatin 40 milliGRAM(s) Oral at bedtime  clopidogrel Tablet 75 milliGRAM(s) Oral daily  docusate sodium 100 milliGRAM(s) Oral three times a day  mesalamine DR Capsule 800 milliGRAM(s) Oral <User Schedule>  pantoprazole    Tablet 40 milliGRAM(s) Oral before breakfast  sodium chloride 0.9%. 1000 milliLiter(s) (40 mL/Hr) IV Continuous <Continuous>  tamsulosin 0.4 milliGRAM(s) Oral at bedtime  tiotropium 18 MICROgram(s) Capsule 1 Capsule(s) Inhalation daily    MEDICATIONS  (PRN):           ***** REVIEW OF SYSTEM:  GEN: no fever, no chills, no pain  RESP: no SOB, no cough, no sputum  CVS: no chest pain, no palpitations, no edema  GI: no abdominal pain, no nausea, no vomiting, no constipation, no diarrhea  : no dysurea, no frequency  NEURO: no headache, no diziness  PSYCH: no depression, not anxious  Derm : no itching, no rash         ***** VITAL SIGNS:  T(F): 97.7 (18 @ 11:39), Max: 98.5 (18 @ 21:43)  HR: 91 (18 @ 16:23) (76 - 101)  BP: 98/55 (18 @ 16:23) (66/38 - 98/55)  RR: 16 (18 @ 11:39) (16 - 20)  SpO2: 97% (18 @ 11:39) (97% - 99%)  Wt(kg): --  ,   I&O's Summary    2018 07:01  -  2018 07:00  --------------------------------------------------------  IN: 1200 mL / OUT: 400 mL / NET: 800 mL    2018 07:01  -  2018 17:34  --------------------------------------------------------  IN: 360 mL / OUT: 450 mL / NET: -90 mL             *****PHYSICAL EXAM:   alert oriented x 2 attention comprehension are fair.  Able to name, repeat.   EOmi fundi not visualized   no nystagmus VFF to confrontation  Tongue is midline  Palate elevates symmetrically   Moving all 4 ext spontaneously no drift appreciated    Gait not assessed.            *****LAB AND IMAGIN.9    9.7   )-----------( 184      ( 2018 06:12 )             28.6               02-    133<L>  |  99  |  76<H>  ----------------------------<  99  4.1   |  20<L>  |  3.05<H>    Ca    8.1<L>      2018 06:12    TPro  6.2  /  Alb  2.9<L>  /  TBili  1.2  /  DBili  x   /  AST  34  /  ALT  12  /  AlkPhos  119  02-01    PT/INR - ( 2018 06:12 )   PT: 60.9 sec;   INR: 5.45 ratio         PTT - ( 2018 07:18 )  PTT:45.4 sec       CARDIAC MARKERS ( 2018 06:12 )  x     / x     / 622 U/L / x     / x      CARDIAC MARKERS ( 2018 15:44 )  x     / x     / 815 U/L / x     / x                   Normal EEG in the drowsy and asleep states.  [All pertinent recent Imaging/Reports reviewed]           *****A S S E S S M E N T   A N D   P L A N :        88M pmhx htn, afib, chf, copd, small left frontal infarct 3 years ago with mild expressive aphasia here w/ L hip pain s/p fall while getting dressed this morning. Per pt he does not remember the incident very well but does not believe he lost consciousness or hit his head. He is on bloodthinners (plavix and coumadin) for his aFib. Pt denies precipitating sx including cp or dizziness prior to the fall. At baseline pt is sob requiring 3 duonebs per day for his COPD. He is complaining of his usual sob today as well. Pt also denies recent f/c, ap/n/v/d, headaches, balance issues, or any other associated symptoms    Pt reportedly felt weak the night before, and went to bed early.  He does not recall the turn of events on the date of the admission. He was found on the floor unresponsive by the  at the [facility], he is unsure when he woke up and whether he had breakfast that morning. Check orthostatics  likely syncope due to renal insufficiency, management per medicine and renal.  eeg neg for sz.  will continue to follow.  Spoke to NP almas blair ordering ankle xray at the time of the visit for R ankle pain.   pt consult   Thank you for allowing me to participate in the care of this patient. Please do not hesitate to call me if you have any  questions.        ________________  Mary Alice Santiago MD  [facility] ([facility]  [phone]     30 minutes spent on total encounter; more than 50 % of the visit was  spent counseling and or  coordinating care by the attending physician.   At the present time, [facility] does not  provide outpatient followup, best to call the your insurance to find a participating provider.  This was explained to you at the time of the visit. Alternatively, if your insurance allows it, you can follow up with a neurologist  Dr. Giuseppe Kenny [phone].

## 2018-02-06 NOTE — DISCHARGE NOTE ADULT - CARE PROVIDER_API CALL
Laureano Berg), Cardiology; Internal Medicine  [address] [place] [address]  Phone: [phone]  Fax: [phone]

## 2018-02-06 NOTE — DISCHARGE NOTE ADULT - CARE PLAN
Principal Discharge DX:	Fall, initial encounter  Goal:	Free of falls  Assessment and plan of treatment:	Likely secondary to hypotension  Certain medicines used for sleep, anxiety, or depression can cause falls. Adding new medicines, or changing doses of some medicines, can also affect your risk of falling. Your doctor might switch you to a different medicine.                                        Prevent falls by make your home safer – To avoid falling at home, get rid of things that might make you trip or slip. This might include furniture, electrical cords, clutter, and loose rugs. Keep your home well lit to avoid falls or accidents. Avoid storing things in high places so you don't have to reach or climb.                             Wear sturdy shoes that fit well – Wearing shoes with high heels or slippery soles, or shoes that are too loose, can lead to falls.                                                                                                                       Take vitamin D pills which might lower the risk of falls in older people. This is because vitamin D helps make bones and muscles stronger.                                                                                                                   Use a cane, walker, and other safety devices as these devices help you avoid falling, too. These include grab bars or a sturdy seat for the shower, non-slip bath mats, and hand rails or treads for the stairs (to prevent slipping). If you worry that you could fall, there are also alarm buttons that let you call for help if you fall and can't get up.   It is important to tell your doctor about any times you have fallen or almost fallen.  Seek immediate help for pain or injury after a fall.  Secondary Diagnosis:	Hypotension, unspecified hypotension type  Assessment and plan of treatment:	Follow up with Dr. Berg (cardiologist) in 1 week who will decide on resuming cardiac medication - Entresto, Toprol, Lasix gradually  Secondary Diagnosis:	Acute on chronic renal insufficiency  Assessment and plan of treatment:	Avoid taking (NSAIDs) - (ex: Ibuprofen, Advil, Celebrex, Naprosyn)  Avoid taking any nephrotoxic agents (can harm kidneys) - Intravenous contrast for diagnostic testing, combination cold medications.  Have all medications adjusted for your renal function by your Health Care Provider.  Blood pressure control is important.  Take all medication as prescribed.  Secondary Diagnosis:	Chronic atrial fibrillation  Assessment and plan of treatment:	Atrial fibrillation is the most common heart rhythm problem.  The condition puts you at risk for has stroke and heart attack  It helps if you control your blood pressure, not drink more than 1-2 alcohol drinks per day, cut down on caffeine, getting treatment for over active thyroid gland, and get regular exercise  Call your doctor if you feel your heart racing or beating unusually, chest tightness or pain, lightheaded, faint, shortness of breath especially with exercise  It is important to take your heart medication as prescribed  Dose coumadin - INR in 2 days  Secondary Diagnosis:	Hyponatremia  Assessment and plan of treatment:	Resolved  Secondary Diagnosis:	Chronic obstructive pulmonary disease, unspecified COPD type  Assessment and plan of treatment:	Call your Health Care provider upon arrival home to make a follow up appointment within one week.  Take all inhalers as prescribed by your Health Care Provider.  If your cough increases infrequency and severity and/or you have shortness of breath or increased shortness of breath call your Health Care Provider.  If you develop fever, chills, night sweats, malaise, and/or change in mental status call your Health care Provider.  Nutrition is very important.  Eat small frequent meals.  Increase your activity as tolerated.  Do not stay in bed all day  Secondary Diagnosis:	Essential hypertension  Assessment and plan of treatment:	Hypertension medications - Lasix, Entresto and Toprol on hold sec to hypotension  Follow up with Dr. Berg (cardiologist) in 1 week who will decide on resuming cardiac medication - Entresto, Toprol, Lasix gradually

## 2018-02-06 NOTE — DISCHARGE NOTE ADULT - ADDITIONAL INSTRUCTIONS
Follow up with Dr. Berg (cardiologist) in 1 week who will decide on resuming cardiac medication - Entresto, Toprol, Lasix gradually

## 2018-02-06 NOTE — DISCHARGE NOTE ADULT - PLAN OF CARE
Free of falls Likely secondary to hypotension  Certain medicines used for sleep, anxiety, or depression can cause falls. Adding new medicines, or changing doses of some medicines, can also affect your risk of falling. Your doctor might switch you to a different medicine.                                        Prevent falls by make your home safer – To avoid falling at home, get rid of things that might make you trip or slip. This might include furniture, electrical cords, clutter, and loose rugs. Keep your home well lit to avoid falls or accidents. Avoid storing things in high places so you don't have to reach or climb.                             Wear sturdy shoes that fit well – Wearing shoes with high heels or slippery soles, or shoes that are too loose, can lead to falls.                                                                                                                       Take vitamin D pills which might lower the risk of falls in older people. This is because vitamin D helps make bones and muscles stronger.                                                                                                                   Use a cane, walker, and other safety devices as these devices help you avoid falling, too. These include grab bars or a sturdy seat for the shower, non-slip bath mats, and hand rails or treads for the stairs (to prevent slipping). If you worry that you could fall, there are also alarm buttons that let you call for help if you fall and can't get up.   It is important to tell your doctor about any times you have fallen or almost fallen.  Seek immediate help for pain or injury after a fall. Follow up with Dr. Berg (cardiologist) in 1 week who will decide on resuming cardiac medication - Entresto, Toprol, Lasix gradually Avoid taking (NSAIDs) - (ex: Ibuprofen, Advil, Celebrex, Naprosyn)  Avoid taking any nephrotoxic agents (can harm kidneys) - Intravenous contrast for diagnostic testing, combination cold medications.  Have all medications adjusted for your renal function by your Health Care Provider.  Blood pressure control is important.  Take all medication as prescribed. Atrial fibrillation is the most common heart rhythm problem.  The condition puts you at risk for has stroke and heart attack  It helps if you control your blood pressure, not drink more than 1-2 alcohol drinks per day, cut down on caffeine, getting treatment for over active thyroid gland, and get regular exercise  Call your doctor if you feel your heart racing or beating unusually, chest tightness or pain, lightheaded, faint, shortness of breath especially with exercise  It is important to take your heart medication as prescribed  Dose coumadin - INR in 2 days Resolved Call your Health Care provider upon arrival home to make a follow up appointment within one week.  Take all inhalers as prescribed by your Health Care Provider.  If your cough increases infrequency and severity and/or you have shortness of breath or increased shortness of breath call your Health Care Provider.  If you develop fever, chills, night sweats, malaise, and/or change in mental status call your Health care Provider.  Nutrition is very important.  Eat small frequent meals.  Increase your activity as tolerated.  Do not stay in bed all day Hypertension medications - Lasix, Entresto and Toprol on hold sec to hypotension  Follow up with Dr. Berg (cardiologist) in 1 week who will decide on resuming cardiac medication - Entresto, Toprol, Lasix gradually

## 2018-02-06 NOTE — DISCHARGE NOTE ADULT - MEDICATION SUMMARY - MEDICATIONS TO CHANGE
I will SWITCH the dose or number of times a day I take the medications listed below when I get home from the hospital:    pantoprazole 20 mg oral delayed release tablet  -- 1 tab(s) by mouth once a day    DuoNeb 0.5 mg-2.5 mg/3 mL inhalation solution  -- 3 milliliter(s) inhaled every 4 hours    warfarin 3 mg oral tablet  -- 1 tab(s) by mouth once a day

## 2018-02-06 NOTE — DISCHARGE NOTE ADULT - MEDICATION SUMMARY - MEDICATIONS TO STOP TAKING
I will STOP taking the medications listed below when I get home from the hospital:    metoprolol succinate 25 mg oral tablet, extended release  -- 1 tab(s) by mouth once a day    acetaminophen-oxyCODONE 325 mg-5 mg oral tablet  -- 1 tab(s) by mouth every 4 hours, As needed, Moderate Pain    Lasix 40 mg oral tablet  -- 1 tab(s) by mouth once a day    Toprol-XL 50 mg oral tablet, extended release  -- 1 tab(s) by mouth 2 times a day    Entresto 49 mg-51 mg oral tablet  -- 1 tab(s) by mouth 2 times a day

## 2018-02-06 NOTE — PROGRESS NOTE ADULT - PROBLEM SELECTOR PLAN 3
Etiology ?  Get Urine Na, urine osmolality, serum osmolality, TSH, serum cortisol.
sec to dehydration
sec to dehydration, improving
sec to dehydration, improving
sec to dehydration. Improving

## 2018-02-06 NOTE — PROGRESS NOTE ADULT - PROBLEM SELECTOR PROBLEM 1
ELKIN (acute kidney injury)

## 2018-02-06 NOTE — DISCHARGE NOTE ADULT - HOSPITAL COURSE
88 M PMH HTN, AFIB, CHF, COPD, CKD here w/ L hip pain s/p fall while getting dressed this morning. Per pt he does not remember the incident very well but does not believe he lost consciousness or hit his head. He is on blood thinners (plavix and coumadin) for his AFIB. Pt denies precipitating sx including cp or dizziness prior to the fall. At baseline pt is sob requiring 3 duonebs per day for his COPD.  Syncope Likely sec to hypotension - held toprol, entresto, lasix. Out pt echo shows EF mod to sev reduced , orthostatics negative.  Head CT: No evidence for intracranial hemorrhage, mass effect, or displaced calvarial fracture.  Cervical spine CT: No evidence for acute displaced fracture or traumatic malalignment. Cervical degenerative spondylosis  EEG - no Seizures   Patient noted to have acute on chronic renal insufficiency which is improved  Renal Ultrasound - Increased renal cortical echogenicity bilaterally  Patient with Chronic systolic CHF - hypotensive hold toprol, entresto, lasix.    ID consult for positive blood culture 1/2 bottles, PCR with coagulase negative Staphylococcus. Suspect procurement contaminant. Received 1 dose of vancomycin. No other obvious source of infection, U/A negative, CXR with no pneumonia, no diarrhea. Repeat blood cultures negative and observed off antibiotics. 88 M PMH HTN, AFIB, CHF, COPD, CKD here w/ L hip pain s/p fall while getting dressed this morning. Per pt he does not remember the incident very well but does not believe he lost consciousness or hit his head. He is on blood thinners (plavix and coumadin) for his AFIB. Pt denies precipitating sx including cp or dizziness prior to the fall. At baseline pt is sob requiring 3 duonebs per day for his COPD.  Syncope Likely sec to hypotension - held toprol, entresto, lasix. Out pt echo shows EF mod to sev reduced , orthostatics negative.  Head CT: No evidence for intracranial hemorrhage, mass effect, or displaced calvarial fracture.  Cervical spine CT: No evidence for acute displaced fracture or traumatic malalignment. Cervical degenerative spondylosis  EEG - no Seizures. Neurology consulted - No neurological cause for fall and syncope  Renal consulted for acute on chronic renal insufficiency, creatinine on admission 3.21 which improved to 1.36 on 2/6/18  Renal Ultrasound - Increased renal cortical echogenicity bilaterally  Patient with Chronic systolic CHF - hypotensive hold toprol, entresto, lasix.  Patient with Atrial Fibrillation - on coumadin. Was supratherapeutic on admission - 5.45 on 2/1. Coumadin held and resumed when INR improved. INR on 2/6/18 - 1.93  For Chronic systolic HF - Lasix is on hold sec to hypotension  ID consult for positive blood culture 1/2 bottles, PCR with coagulase negative Staphylococcus. Suspect procurement contaminant. Received 1 dose of vancomycin. No other obvious source of infection, U/A negative, CXR with no pneumonia, no diarrhea. Repeat blood cultures negative and observed off antibiotics.    S[place] inserted for I/O monitoring - Discharge to Rehab with Trial of Void at the Rehab    Follow up with Dr. Berg (cardiologist) in 1 week who will decide on resuming cardiac medication - Entresto, Toprol, Lasix gradually

## 2018-02-06 NOTE — PROGRESS NOTE ADULT - PROBLEM SELECTOR PLAN 2
BP is low, not on any antihypertensives  Etiology?, has bacteremia, ID/Cardiology on board
BP is low, not on any antihypertensives  Etiology?, has bacteremia, ID/Cardiology on board
BP was low, better now  Etiology?, has bacteremia, ID/Cardiology on board

## 2018-02-06 NOTE — DISCHARGE NOTE ADULT - MEDICATION SUMMARY - MEDICATIONS TO TAKE
I will START or STAY ON the medications listed below when I get home from the hospital:    mesalamine 400 mg oral delayed release capsule  -- 2 cap(s) by mouth 2 times a day at 8am and   8 pm  -- Indication: For Ulcerative Colitis    Ecotrin Adult Low Strength  -- 81 milligram(s) by mouth once a day  -- Indication: For Cardiac protection and stroke prevention    Flomax 0.4 mg oral capsule  -- 1 cap(s) by mouth once a day (at bedtime)  -- Indication: For BPH    Coumadin 2.5 mg oral tablet  -- 1 tab(s) by mouth once a day  -- Indication: For Afib    allopurinol 100 mg oral tablet  -- 1 tab(s) by mouth once a day  -- Indication: For Gout    Lipitor 40 mg oral tablet  -- 1 tab(s) by mouth once a day  -- Indication: For Hyperlipidemia    Plavix 75 mg oral tablet  -- 1 tab(s) by mouth once a day  -- Indication: For CVA    Spiriva 18 mcg inhalation capsule  -- 1 cap(s) inhaled once a day  -- Indication: For COPD (chronic obstructive pulmonary disease)    albuterol 90 mcg/inh inhalation aerosol  -- 2 puff(s) inhaled every 6 hours  -- Indication: For COPD (chronic obstructive pulmonary disease)    ipratropium-albuterol 0.5 mg-2.5 mg/3 mLinhalation solution  -- 3 milliliter(s) inhaled every 6 hours, As needed, Wheezing  -- Indication: For COPD (chronic obstructive pulmonary disease)    docusate sodium 100 mg oral capsule  -- 1 cap(s) by mouth 3 times a day  -- Indication: For COnstipation    pantoprazole 40 mg oral delayed release tablet  -- 1 tab(s) by mouth once a day  -- Indication: For GI prophylaxis

## 2018-02-06 NOTE — DISCHARGE NOTE ADULT - PATIENT PORTAL LINK FT
You can access the [facility], offered by [facility], by registering with the following website: [email or web address]

## 2018-02-07 LAB
CULTURE RESULTS: SIGNIFICANT CHANGE UP
CULTURE RESULTS: SIGNIFICANT CHANGE UP
SPECIMEN SOURCE: SIGNIFICANT CHANGE UP
SPECIMEN SOURCE: SIGNIFICANT CHANGE UP

## 2018-02-09 LAB
CORTICOSTEROID BINDING GLOBULIN RESULT: 2.9 MG/DL — SIGNIFICANT CHANGE UP
CORTIS F/TOTAL MFR SERPL: 3.2 % — SIGNIFICANT CHANGE UP
CORTIS SERPL-MCNC: 9.3 UG/DL — SIGNIFICANT CHANGE UP
CORTISOL, FREE RESULT: 0.3 UG/DL — SIGNIFICANT CHANGE UP

## 2018-04-30 ENCOUNTER — EMERGENCY (EMERGENCY)
Facility: HOSPITAL | Age: 83
LOS: 1 days | Discharge: HOME | End: 2018-04-30
Attending: EMERGENCY MEDICINE | Admitting: EMERGENCY MEDICINE
Payer: MEDICARE

## 2018-04-30 VITALS
TEMPERATURE: 98 F | RESPIRATION RATE: 18 BRPM | OXYGEN SATURATION: 96 % | DIASTOLIC BLOOD PRESSURE: 76 MMHG | HEART RATE: 76 BPM | SYSTOLIC BLOOD PRESSURE: 121 MMHG

## 2018-04-30 VITALS
SYSTOLIC BLOOD PRESSURE: 122 MMHG | OXYGEN SATURATION: 100 % | HEART RATE: 72 BPM | DIASTOLIC BLOOD PRESSURE: 76 MMHG | TEMPERATURE: 98 F | RESPIRATION RATE: 16 BRPM

## 2018-04-30 DIAGNOSIS — J44.9 CHRONIC OBSTRUCTIVE PULMONARY DISEASE, UNSPECIFIED: ICD-10-CM

## 2018-04-30 DIAGNOSIS — I48.91 UNSPECIFIED ATRIAL FIBRILLATION: ICD-10-CM

## 2018-04-30 DIAGNOSIS — R55 SYNCOPE AND COLLAPSE: ICD-10-CM

## 2018-04-30 DIAGNOSIS — I10 ESSENTIAL (PRIMARY) HYPERTENSION: ICD-10-CM

## 2018-04-30 DIAGNOSIS — I48.2 CHRONIC ATRIAL FIBRILLATION: ICD-10-CM

## 2018-04-30 DIAGNOSIS — R00.1 BRADYCARDIA, UNSPECIFIED: ICD-10-CM

## 2018-04-30 DIAGNOSIS — R79.1 ABNORMAL COAGULATION PROFILE: ICD-10-CM

## 2018-04-30 DIAGNOSIS — N17.9 ACUTE KIDNEY FAILURE, UNSPECIFIED: ICD-10-CM

## 2018-04-30 DIAGNOSIS — I50.9 HEART FAILURE, UNSPECIFIED: ICD-10-CM

## 2018-04-30 DIAGNOSIS — Z29.9 ENCOUNTER FOR PROPHYLACTIC MEASURES, UNSPECIFIED: ICD-10-CM
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
## 2018-04-30 DIAGNOSIS — I25.10 ATHEROSCLEROTIC HEART DISEASE OF NATIVE CORONARY ARTERY WITHOUT ANGINA PECTORIS: ICD-10-CM

## 2018-04-30 LAB
ALBUMIN SERPL ELPH-MCNC: 3.9 G/DL — SIGNIFICANT CHANGE UP (ref 3.3–5)
ALP SERPL-CCNC: 83 U/L — SIGNIFICANT CHANGE UP (ref 40–120)
ALT FLD-CCNC: 48 U/L — HIGH (ref 10–45)
ANION GAP SERPL CALC-SCNC: 15 MMOL/L — SIGNIFICANT CHANGE UP (ref 5–17)
AST SERPL-CCNC: 31 U/L — SIGNIFICANT CHANGE UP (ref 10–40)
BASOPHILS # BLD AUTO: 0 K/UL — SIGNIFICANT CHANGE UP (ref 0–0.2)
BASOPHILS NFR BLD AUTO: 0.3 % — SIGNIFICANT CHANGE UP (ref 0–2)
BILIRUB SERPL-MCNC: 1.4 MG/DL — HIGH (ref 0.2–1.2)
BLD GP AB SCN SERPL QL: NEGATIVE — SIGNIFICANT CHANGE UP
BUN SERPL-MCNC: 70 MG/DL — HIGH (ref 7–23)
CALCIUM SERPL-MCNC: 8.7 MG/DL — SIGNIFICANT CHANGE UP (ref 8.4–10.5)
CHLORIDE SERPL-SCNC: 104 MMOL/L — SIGNIFICANT CHANGE UP (ref 96–108)
CK MB BLD-MCNC: 6.4 % — HIGH (ref 0–3.5)
CK MB CFR SERPL CALC: 5.4 NG/ML — SIGNIFICANT CHANGE UP (ref 0–6.7)
CK SERPL-CCNC: 85 U/L — SIGNIFICANT CHANGE UP (ref 30–200)
CO2 SERPL-SCNC: 20 MMOL/L — LOW (ref 22–31)
CREAT SERPL-MCNC: 1.92 MG/DL — HIGH (ref 0.5–1.3)
EOSINOPHIL # BLD AUTO: 0.1 K/UL — SIGNIFICANT CHANGE UP (ref 0–0.5)
EOSINOPHIL NFR BLD AUTO: 1 % — SIGNIFICANT CHANGE UP (ref 0–6)
GAS PNL BLDV: SIGNIFICANT CHANGE UP
GLUCOSE SERPL-MCNC: 91 MG/DL — SIGNIFICANT CHANGE UP (ref 70–99)
HCT VFR BLD CALC: 31.5 % — LOW (ref 39–50)
HGB BLD-MCNC: 10.2 G/DL — LOW (ref 13–17)
INR BLD: 5.76 RATIO — CRITICAL HIGH (ref 0.88–1.16)
LYMPHOCYTES # BLD AUTO: 0.7 K/UL — LOW (ref 1–3.3)
LYMPHOCYTES # BLD AUTO: 8.9 % — LOW (ref 13–44)
MAGNESIUM SERPL-MCNC: 1.6 MG/DL — SIGNIFICANT CHANGE UP (ref 1.6–2.6)
MCHC RBC-ENTMCNC: 30.9 PG — SIGNIFICANT CHANGE UP (ref 27–34)
MCHC RBC-ENTMCNC: 32.4 GM/DL — SIGNIFICANT CHANGE UP (ref 32–36)
MCV RBC AUTO: 95.2 FL — SIGNIFICANT CHANGE UP (ref 80–100)
MONOCYTES # BLD AUTO: 0.8 K/UL — SIGNIFICANT CHANGE UP (ref 0–0.9)
MONOCYTES NFR BLD AUTO: 10.3 % — SIGNIFICANT CHANGE UP (ref 2–14)
NEUTROPHILS # BLD AUTO: 6.3 K/UL — SIGNIFICANT CHANGE UP (ref 1.8–7.4)
NEUTROPHILS NFR BLD AUTO: 79.5 % — HIGH (ref 43–77)
NT-PROBNP SERPL-SCNC: HIGH PG/ML (ref 0–300)
PLATELET # BLD AUTO: 166 K/UL — SIGNIFICANT CHANGE UP (ref 150–400)
POTASSIUM SERPL-MCNC: 4 MMOL/L — SIGNIFICANT CHANGE UP (ref 3.5–5.3)
POTASSIUM SERPL-SCNC: 4 MMOL/L — SIGNIFICANT CHANGE UP (ref 3.5–5.3)
PROT SERPL-MCNC: 6.2 G/DL — SIGNIFICANT CHANGE UP (ref 6–8.3)
PROTHROM AB SERPL-ACNC: 65.1 SEC — HIGH (ref 9.8–12.7)
RBC # BLD: 3.31 M/UL — LOW (ref 4.2–5.8)
RBC # FLD: 15.1 % — HIGH (ref 10.3–14.5)
RH IG SCN BLD-IMP: POSITIVE — SIGNIFICANT CHANGE UP
SODIUM SERPL-SCNC: 139 MMOL/L — SIGNIFICANT CHANGE UP (ref 135–145)
TROPONIN T SERPL-MCNC: 0.04 NG/ML — SIGNIFICANT CHANGE UP (ref 0–0.06)
TSH SERPL-MCNC: 0.93 UIU/ML — SIGNIFICANT CHANGE UP (ref 0.27–4.2)
WBC # BLD: 7.9 K/UL — SIGNIFICANT CHANGE UP (ref 3.8–10.5)
WBC # FLD AUTO: 7.9 K/UL — SIGNIFICANT CHANGE UP (ref 3.8–10.5)

## 2018-04-30 PROCEDURE — 80053 COMPREHEN METABOLIC PANEL: CPT

## 2018-04-30 PROCEDURE — 84132 ASSAY OF SERUM POTASSIUM: CPT

## 2018-04-30 PROCEDURE — 84484 ASSAY OF TROPONIN QUANT: CPT

## 2018-04-30 PROCEDURE — 83880 ASSAY OF NATRIURETIC PEPTIDE: CPT

## 2018-04-30 PROCEDURE — 71045 X-RAY EXAM CHEST 1 VIEW: CPT

## 2018-04-30 PROCEDURE — 86901 BLOOD TYPING SEROLOGIC RH(D): CPT

## 2018-04-30 PROCEDURE — 85610 PROTHROMBIN TIME: CPT

## 2018-04-30 PROCEDURE — 82553 CREATINE MB FRACTION: CPT

## 2018-04-30 PROCEDURE — 84443 ASSAY THYROID STIM HORMONE: CPT

## 2018-04-30 PROCEDURE — 99284 EMERGENCY DEPT VISIT MOD MDM: CPT | Mod: 25

## 2018-04-30 PROCEDURE — 83605 ASSAY OF LACTIC ACID: CPT

## 2018-04-30 PROCEDURE — 86900 BLOOD TYPING SEROLOGIC ABO: CPT

## 2018-04-30 PROCEDURE — 82947 ASSAY GLUCOSE BLOOD QUANT: CPT

## 2018-04-30 PROCEDURE — 83735 ASSAY OF MAGNESIUM: CPT

## 2018-04-30 PROCEDURE — 72125 CT NECK SPINE W/O DYE: CPT

## 2018-04-30 PROCEDURE — 85014 HEMATOCRIT: CPT

## 2018-04-30 PROCEDURE — 90715 TDAP VACCINE 7 YRS/> IM: CPT

## 2018-04-30 PROCEDURE — 86850 RBC ANTIBODY SCREEN: CPT

## 2018-04-30 PROCEDURE — 70450 CT HEAD/BRAIN W/O DYE: CPT

## 2018-04-30 PROCEDURE — 90471 IMMUNIZATION ADMIN: CPT

## 2018-04-30 PROCEDURE — 84295 ASSAY OF SERUM SODIUM: CPT

## 2018-04-30 PROCEDURE — 82435 ASSAY OF BLOOD CHLORIDE: CPT

## 2018-04-30 PROCEDURE — 82550 ASSAY OF CK (CPK): CPT

## 2018-04-30 PROCEDURE — 93005 ELECTROCARDIOGRAM TRACING: CPT

## 2018-04-30 PROCEDURE — 85027 COMPLETE CBC AUTOMATED: CPT

## 2018-04-30 PROCEDURE — 82330 ASSAY OF CALCIUM: CPT

## 2018-04-30 PROCEDURE — 82803 BLOOD GASES ANY COMBINATION: CPT

## 2018-04-30 RX ORDER — TETANUS TOXOID, REDUCED DIPHTHERIA TOXOID AND ACELLULAR PERTUSSIS VACCINE, ADSORBED 5; 2.5; 8; 8; 2.5 [IU]/.5ML; [IU]/.5ML; UG/.5ML; UG/.5ML; UG/.5ML
0.5 SUSPENSION INTRAMUSCULAR ONCE
Qty: 0 | Refills: 0 | Status: COMPLETED | OUTPATIENT
Start: 2018-04-30 | End: 2018-04-30

## 2018-04-30 RX ORDER — MESALAMINE 400 MG
2 TABLET, DELAYED RELEASE (ENTERIC COATED) ORAL
Qty: 0 | Refills: 0 | COMMUNITY

## 2018-04-30 RX ORDER — CHOLECALCIFEROL (VITAMIN D3) 125 MCG
1000 CAPSULE ORAL DAILY
Qty: 0 | Refills: 0 | Status: DISCONTINUED | OUTPATIENT
Start: 2018-04-30 | End: 2018-04-30

## 2018-04-30 RX ORDER — MESALAMINE 400 MG
800 TABLET, DELAYED RELEASE (ENTERIC COATED) ORAL THREE TIMES A DAY
Qty: 0 | Refills: 0 | Status: DISCONTINUED | OUTPATIENT
Start: 2018-04-30 | End: 2018-04-30

## 2018-04-30 RX ORDER — ALLOPURINOL 300 MG
1 TABLET ORAL
Qty: 0 | Refills: 0 | COMMUNITY

## 2018-04-30 RX ORDER — CLOTRIMAZOLE AND BETAMETHASONE DIPROPIONATE 10; .5 MG/G; MG/G
1 CREAM TOPICAL
Qty: 0 | Refills: 0 | Status: DISCONTINUED | OUTPATIENT
Start: 2018-04-30 | End: 2018-04-30

## 2018-04-30 RX ORDER — SACUBITRIL AND VALSARTAN 24; 26 MG/1; MG/1
1 TABLET, FILM COATED ORAL
Qty: 0 | Refills: 0 | Status: DISCONTINUED | OUTPATIENT
Start: 2018-04-30 | End: 2018-04-30

## 2018-04-30 RX ORDER — PANTOPRAZOLE SODIUM 20 MG/1
1 TABLET, DELAYED RELEASE ORAL
Qty: 0 | Refills: 0 | COMMUNITY

## 2018-04-30 RX ORDER — SODIUM CHLORIDE 9 MG/ML
500 INJECTION INTRAMUSCULAR; INTRAVENOUS; SUBCUTANEOUS
Qty: 0 | Refills: 0 | Status: DISCONTINUED | OUTPATIENT
Start: 2018-04-30 | End: 2018-04-30

## 2018-04-30 RX ORDER — ALLOPURINOL 300 MG
100 TABLET ORAL DAILY
Qty: 0 | Refills: 0 | Status: DISCONTINUED | OUTPATIENT
Start: 2018-04-30 | End: 2018-04-30

## 2018-04-30 RX ORDER — ATORVASTATIN CALCIUM 80 MG/1
40 TABLET, FILM COATED ORAL AT BEDTIME
Qty: 0 | Refills: 0 | Status: DISCONTINUED | OUTPATIENT
Start: 2018-04-30 | End: 2018-04-30

## 2018-04-30 RX ORDER — CLOPIDOGREL BISULFATE 75 MG/1
75 TABLET, FILM COATED ORAL DAILY
Qty: 0 | Refills: 0 | Status: DISCONTINUED | OUTPATIENT
Start: 2018-04-30 | End: 2018-04-30

## 2018-04-30 RX ORDER — PANTOPRAZOLE SODIUM 20 MG/1
40 TABLET, DELAYED RELEASE ORAL
Qty: 0 | Refills: 0 | Status: DISCONTINUED | OUTPATIENT
Start: 2018-04-30 | End: 2018-04-30

## 2018-04-30 RX ORDER — IPRATROPIUM/ALBUTEROL SULFATE 18-103MCG
3 AEROSOL WITH ADAPTER (GRAM) INHALATION EVERY 4 HOURS
Qty: 0 | Refills: 0 | Status: DISCONTINUED | OUTPATIENT
Start: 2018-04-30 | End: 2018-04-30

## 2018-04-30 RX ORDER — PREGABALIN 225 MG/1
1000 CAPSULE ORAL DAILY
Qty: 0 | Refills: 0 | Status: DISCONTINUED | OUTPATIENT
Start: 2018-04-30 | End: 2018-04-30

## 2018-04-30 RX ORDER — ATORVASTATIN CALCIUM 80 MG/1
1 TABLET, FILM COATED ORAL
Qty: 0 | Refills: 0 | COMMUNITY

## 2018-04-30 RX ORDER — PHYTONADIONE (VIT K1) 5 MG
2.5 TABLET ORAL ONCE
Qty: 0 | Refills: 0 | Status: DISCONTINUED | OUTPATIENT
Start: 2018-04-30 | End: 2018-04-30

## 2018-04-30 RX ORDER — TIOTROPIUM BROMIDE 18 UG/1
1 CAPSULE ORAL; RESPIRATORY (INHALATION)
Qty: 0 | Refills: 0 | COMMUNITY

## 2018-04-30 RX ORDER — ACETAMINOPHEN 500 MG
650 TABLET ORAL EVERY 6 HOURS
Qty: 0 | Refills: 0 | Status: DISCONTINUED | OUTPATIENT
Start: 2018-04-30 | End: 2018-04-30

## 2018-04-30 RX ORDER — TAMSULOSIN HYDROCHLORIDE 0.4 MG/1
0.4 CAPSULE ORAL AT BEDTIME
Qty: 0 | Refills: 0 | Status: DISCONTINUED | OUTPATIENT
Start: 2018-04-30 | End: 2018-04-30

## 2018-04-30 RX ORDER — TAMSULOSIN HYDROCHLORIDE 0.4 MG/1
1 CAPSULE ORAL
Qty: 0 | Refills: 0 | COMMUNITY

## 2018-04-30 RX ORDER — MAGNESIUM SULFATE 500 MG/ML
1 VIAL (ML) INJECTION ONCE
Qty: 0 | Refills: 0 | Status: DISCONTINUED | OUTPATIENT
Start: 2018-04-30 | End: 2018-04-30

## 2018-04-30 RX ADMIN — TETANUS TOXOID, REDUCED DIPHTHERIA TOXOID AND ACELLULAR PERTUSSIS VACCINE, ADSORBED 0.5 MILLILITER(S): 5; 2.5; 8; 8; 2.5 SUSPENSION INTRAMUSCULAR at 13:15

## 2018-04-30 RX ADMIN — SODIUM CHLORIDE 75 MILLILITER(S): 9 INJECTION INTRAMUSCULAR; INTRAVENOUS; SUBCUTANEOUS at 12:47

## 2018-04-30 RX ADMIN — SODIUM CHLORIDE 75 MILLILITER(S): 9 INJECTION INTRAMUSCULAR; INTRAVENOUS; SUBCUTANEOUS at 14:59

## 2018-04-30 NOTE — H&P ADULT - NEUROLOGICAL DETAILS
responds to verbal commands/sensation intact/normal strength/alert and oriented x 3/cranial nerves intact

## 2018-04-30 NOTE — CONSULT NOTE ADULT - SUBJECTIVE AND OBJECTIVE BOX
CHIEF COMPLAINT: Syncope    HISTORY OF PRESENT ILLNESS: 88M PMH HTN, HLD, chronic Afib (on coumadin), CAD/MI, s/p 5 stents, AS s/p TAVR, Ischemic cardiomyopathy, chronic systolic HF (EF 20-25%), COPD and CKD who present from [facility] after syncope/fall. He endorses trauma to left eyebrow for the fall.       Allergies    Apresoline (Unknown)  penicillin (Unknown)    MEDICATIONS:  metoprolol tartrate 50 mg BID  clopidogrel Tablet 75 milliGRAM(s) Oral daily  sacubitril 49 mG/valsartan 51 mG 1 Tablet(s) Oral two times a day  furosemide 20 mg once daily  tamsulosin 0.4 milliGRAM(s) Oral at bedtime    ALBUTerol/ipratropium for Nebulization 3 milliLiter(s) Nebulizer every 4 hours    mesalamine DR Capsule 800 milliGRAM(s) Oral three times a day  pantoprazole    Tablet 40 milliGRAM(s) Oral before breakfast    allopurinol 100 milliGRAM(s) Oral daily  atorvastatin 40 milliGRAM(s) Oral at bedtime  predniSONE   Tablet 15 milliGRAM(s) Oral daily    clotrimazole/betamethasone Cream 1 Application(s) Topical two times a day  cyanocobalamin 1000 MICROGram(s) Oral daily  phytonadione   Solution 2.5 milliGRAM(s) Oral once      PAST MEDICAL & SURGICAL HISTORY:  Afib  HTN (hypertension)  CHF (congestive heart failure)  COPD (chronic obstructive pulmonary disease)  PAD (peripheral artery disease)  Hypercholesterolemia  Atrial fibrillation  Hypertension  No significant past surgical history  Abnormal coronary angiogram: 3 different times had a total of 5 stents placed  S/P aortic valve repair      FAMILY HISTORY:  No pertinent family history in first degree relatives  Family history of cerebrovascular accident      SOCIAL HISTORY: Former smoker    REVIEW OF SYSTEMS:  See HPI. Otherwise, 10 point ROS done and otherwise negative.    PHYSICAL EXAM:  T(C): 37 (04-30-18 @ 15:45), Max: 37 (04-30-18 @ 15:45)  HR: 74 (04-30-18 @ 15:45) (74 - 87)  BP: 115/62 (04-30-18 @ 15:45) (103/65 - 121/76)  RR: 15 (04-30-18 @ 15:45) (15 - 18)  SpO2: 100% (04-30-18 @ 15:45) (96% - 100%)  I&O's Summary      Appearance: Normal 	  HEENT:   Normal oral mucosa, PERRL, EOMI	  Lymphatic: No lymphadenopathy  Cardiovascular: Normal S1 S2, No JVD, (+) III/VI systolic murmurs, No edema  Respiratory: Fine crackles at b/l bases 	  Psychiatry: A & O x 3, Mood & affect appropriate  Gastrointestinal:  Soft, Non-tender, + BS	  Skin: No rashes, No ecchymoses, No cyanosis	  Neurologic: Non-focal  Extremities: Normal range of motion, No clubbing, cyanosis or edema  Vascular: Peripheral pulses palpable 2+ bilaterally      LABS:	 	    CBC Full  -  ( 30 Apr 2018 12:08 )  WBC Count : 7.9 K/uL  Hemoglobin : 10.2 g/dL  Hematocrit : 31.5 %  Platelet Count - Automated : 166 K/uL  Mean Cell Volume : 95.2 fl  Mean Cell Hemoglobin : 30.9 pg  Mean Cell Hemoglobin Concentration : 32.4 gm/dL  Auto Neutrophil # : 6.3 K/uL  Auto Lymphocyte # : 0.7 K/uL  Auto Monocyte # : 0.8 K/uL  Auto Eosinophil # : 0.1 K/uL  Auto Basophil # : 0.0 K/uL  Auto Neutrophil % : 79.5 %  Auto Lymphocyte % : 8.9 %  Auto Monocyte % : 10.3 %  Auto Eosinophil % : 1.0 %  Auto Basophil % : 0.3 %    04-30    139  |  104  |  70<H>  ----------------------------<  91  4.0   |  20<L>  |  1.92<H>    Ca    8.7      30 Apr 2018 12:08    TPro  6.2  /  Alb  3.9  /  TBili  1.4<H>  /  DBili  x   /  AST  31  /  ALT  48<H>  /  AlkPhos  83  04-30    proBNP: Serum Pro-Brain Natriuretic Peptide: 54654 pg/mL (04-30 @ 12:08)      CARDIAC MARKERS:  Troponin T, Serum: 0.04 ng/mL (04-30-18 @ 12:08)  Creatine Kinase, Serum: 85 U/L (04-30-18 @ 12:08)      TELEMETRY: Atrial fibrillation  at 70's with frequent PVCs   	    ECG: Atrial fibrillation, LBBB, QRSd 170 ms     	  RADIOLOGY:  < from: CT Head No Cont (04.30.18 @ 15:25) >  INTERPRETATION:  CLINICAL INFORMATION: Syncope with fall.    TECHNIQUE: Noncontrast CT of the head and cervical spine was performed.   The Head CT was aquired with 5mm axial images, with coronal and sagittal   reformats generated.       The Cervical Spine CT was acquired with thin section axial images, with   coronal and sagittal reformats generated.          COMPARISON: Head and cervical spine CTs from 1/30/2018.    FINDINGS:    HEAD CT:    There is no acute intra-axial or extra-axial hemorrhage. There is no mass   effect, effacement of the basal cisterns or shift of midline structures.   There is no evidence of acute territorial infarct.    Prominent ventricles and sulci are compatible with age-appropriate   cerebral volume loss. There is moderate chronic white matter   microvascular ischemic change. 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    The visualized paranasal sinuses, mastoid air cells and middle ear   cavities are clear. Status post bilateral cataract surgery.    There is no displaced calvarial fracture.    CERVICAL SPINE CT:    There is no acute fracture, subluxation, or prevertebral widening. The   craniocervical junction is intact.    Vertebral body heights are maintained. There is mild straightening of the   normal cervical lordosis, likely on the basis of positioning and/or neck   muscle spasm. There is multilevel cervical degenerative spondylosis   characterized by facet joint degeneration, uncovertebral bony productive   change, and anterior osteophyte formation. There are multilevel neural   foraminal stenoses. The intraspinal contents are grossly unremarkable.    Ligamentous calcification at C1-2 level is identified which mildly   narrows the AP diameter of the spinal canal but does not cause   significant canal compression or cord compression.    The bilateral lung apices are clear. The surrounding soft tissues are   unremarkable.      IMPRESSION:    No change from 1/30/2018.    HEAD CT: No acute intracranial hemorrhage, mass effect or acute   territorial infarct. Age-appropriate involutional changes and ischemic  gliotic changes with old infarcts.    CERVICAL SPINE CT: No acute cervical spine fracture or traumatic   vertebral subluxation. Degenerative changes.    < end of copied text >

## 2018-04-30 NOTE — ED PROVIDER NOTE - OBJECTIVE STATEMENT
87 yo M h/o colitis, HTN, HLD, COPD BIBA after falling at [facility] 89 yo M h/o colitis on prednisone, HTN on metoprolol, HLD, COPD, CHF, AFib on coumadin BIBA after syncopizing with preceding dizziness this morning while walking to the door. Pt endorses trauma L eyebrow. EMS reports bradycardia in 30's while in transit, but in ED HR is 79 EKG shows AFib with PVCs. Pt cardiologist Laureano Berg [facility] who states arrythmia is known and he has suggested pacemaker to him before. Pt denies CP, SOB, HA, extremity pain, N/V/D, abdominal pain, blood in stool or urine.

## 2018-04-30 NOTE — H&P ADULT - PROBLEM SELECTOR PLAN 1
Likely mechanical with episode of bradycardia  CXR  PT  Vitamin D   Repeat CT head  S/P IV, Hold Lasix for now Likely mechanical with episode of bradycardia  CXR  PT  Vitamin D   Repeat CT head  S/P IV, Hold Lasix for now  Tylenol PRN

## 2018-04-30 NOTE — H&P ADULT - PROBLEM SELECTOR PLAN 8
DVT prophylactic : supra therapeutic INR   DNR/DNI   PT Hold Metoprolol for bradycardia   Continue with home medications   Monitor BP

## 2018-04-30 NOTE — CONSULT NOTE ADULT - ASSESSMENT
88M PMH HTN, HLD, chronic Afib (on coumadin), CAD/MI, s/p 5 stents, AS s/p TAVR, Ischemic cardiomyopathy, chronic systolic HF (EF 20-25%), COPD and CKD who p/w syncope/fall. Patient stated he tripped on a rug and fell. He denies chest pain, palpitations, dizziness or N/V at the time he fell. Patient was admitted on 1/30/2018 with syncope and was not able to recall how he fell and was "out on the floor." Head CT (-) for acute intracranial hemorrhage, mass effect or acute territorial infarct.

## 2018-04-30 NOTE — H&P ADULT - FAMILY HISTORY
Family history of cerebrovascular accident     No pertinent family history in first degree relatives

## 2018-04-30 NOTE — H&P ADULT - HISTORY OF PRESENT ILLNESS
89 y/o male with PMH of HTN, CAD s/p MI s/p 5 stents, AS s/p TAVR 3 years ago, afib on coumadin, CHF, COPD, episodes of bradycardia, IBD BIBA from his assisted living ([facility]) after an episode of fall and trauma to head. Patient states that he got out of his chair and was walking with his walker when her tripped and fall when he was trying to turn with his walker. Patient denies LOC, dizziness, chest pain, dizziness, nausea and vomiting. As per Ed report, EMS reports bradycardia in 30's while in transit and he refused PPM in the past for similar episode of arrhythmia. Patient reports that he had mild dull pain in his head which is resolved. As baseline he is independent with all his ADLS and needs assistance with most of his iADLs. Patient can walk about a block as baseline before getting short of breath and recently was started on Prednisone for his COPD. 89 y/o male with PMH of HTN, CAD s/p MI s/p 5 stents, AS s/p TAVR 3 years ago, afib on coumadin, CHF, COPD, episodes of bradycardia, IBD BIBA from his assisted living ([facility]) after an episode of fall and trauma to head. Patient states that he got out of his chair and was walking with his walker when her tripped and did fall. Patient denies LOC, dizziness, chest pain, dizziness, nausea and vomiting. As per ED/EMS reports bradycardia in 30's while in transit and he refused PPM in the past for similar episode of arrhythmia. Patient reports that he had mild dull pain in his head which is resolved. As baseline he is independent with all his ADLS and needs assistance with most of his iADLs. Patient can walk about a block as baseline before getting short of breath and recently was started on Prednisone for his COPD.

## 2018-04-30 NOTE — H&P ADULT - ASSESSMENT
87 y/o male with PMH of HTN, CAD s/p MI s/p 5 stents, AS s/p TAVR 3 years ago, afib on coumadin, CHF, COPD, episodes of bradycardia, IBD BIBA from his assisted living ([facility]) after an episode of fall and trauma to head found to have supra therapeutic INR and bradycardia.

## 2018-04-30 NOTE — H&P ADULT - PROBLEM SELECTOR PLAN 6
Hold Lasix for now   Hold Metoprolol for bradycardia   2D echo  Hold coumadin for supra therapeutic INR   Cardiac monitoring Hold Lasix for now   Hold Metoprolol for bradycardia   C/W Home medications   2D echo

## 2018-04-30 NOTE — ED PROVIDER NOTE - CARE PLAN
Principal Discharge DX:	Syncope, unspecified syncope type Principal Discharge DX:	Syncope, unspecified syncope type  Assessment and plan of treatment:	- Follow up with your home cardiologist  - Take your medications as prescribed  - Return to ER if you experience loss of consciousness, weakness, chest pain, shortness of breath, or sign of infection such as high fever

## 2018-04-30 NOTE — H&P ADULT - NSHPSOCIALHISTORY_GEN_ALL_CORE
Social History:  Substance Use (street drugs): (X) never used  (  ) other:  Tobacco Usage:  (   ) never smoked   ( X ) former smoker   (   ) current smoker  (     ) pack year  (        ) last cigarette date  Denies any illicit drug use.

## 2018-04-30 NOTE — ED ADULT NURSE NOTE - ADDITIONAL PRINTED INSTRUCTIONS GIVEN
ptn d/c back to the assisted living son will take hiem back pt does not want to be admitted and admission changed to discharge pt d/c back to the assisted living son will take hiem back pt does not want to be admitted and admission changed to discharge

## 2018-04-30 NOTE — H&P ADULT - PROBLEM SELECTOR PLAN 2
Likely chronic   patient refused PPM in past   Cardiac monitoring   Monitor cardiac enzymes   D/W Cardiology   2D echo   Monitor electrolytes   Mg 1gr daily  Hold Metoprolol

## 2018-04-30 NOTE — ED PROVIDER NOTE - PROGRESS NOTE DETAILS
Dr. Garcia spoke with patient and son who understand the risks of discharge at this time. Pt will be escorted back to the [facility] by his son. He was able to ambulate here in the ED. Pt knows to follow up with his home cardiologist and to return to ER if he experiences worsening symptoms. - Marty Gallagher PA-C Pt declines admission and pacemaker. Dr. Garcia spoke with patient and son who understand the risks of discharge at this time. Pt will be escorted back to the [facility] by his son. He was able to ambulate here in the ED. Pt knows to follow up with his home cardiologist and to return to ER if he experiences worsening symptoms. - Marty Gallagher PA-C Spoke to patient and son at length. They say patient is at baseline, and tripped over the rug. States he wants to go home, understand risk is a DNR and does not want any intervention, including medical management. Risk of death explained return precautions disucssed. pt will follow up with Dr. Morel tomorrow morning. [id number]

## 2018-04-30 NOTE — H&P ADULT - NSHPPHYSICALEXAM_GEN_ALL_CORE
Vital Signs Last 24 Hrs  T(C): 37 (30 Apr 2018 15:45), Max: 37 (30 Apr 2018 15:45)  T(F): 98.6 (30 Apr 2018 15:45), Max: 98.6 (30 Apr 2018 15:45)  HR: 74 (30 Apr 2018 15:45) (74 - 87)  BP: 115/62 (30 Apr 2018 15:45) (103/65 - 121/76)  BP(mean): --  RR: 15 (30 Apr 2018 15:45) (15 - 18)  SpO2: 100% (30 Apr 2018 15:45) (96% - 100%)

## 2018-04-30 NOTE — H&P ADULT - PROBLEM SELECTOR PLAN 5
Hold Lasix for now   Hold Metoprolol for bradycardia   2D echo Continue with prednisone 15mg daily   Duoneb q4h   D/W PCP

## 2018-04-30 NOTE — ED ADULT NURSE NOTE - PSH
Abnormal coronary angiogram  3 different times had a total of 5 stents placed  No significant past surgical history    S/P aortic valve repair

## 2018-04-30 NOTE — ED PROVIDER NOTE - PLAN OF CARE
- Follow up with your home cardiologist  - Take your medications as prescribed  - Return to ER if you experience loss of consciousness, weakness, chest pain, shortness of breath, or sign of infection such as high fever

## 2018-04-30 NOTE — CONSULT NOTE ADULT - PROBLEM SELECTOR RECOMMENDATION 9
-Likely a mechanical fall this time but unknown reason of syncope from Jan 2018 as he could not recall how he fell and was "found on the floor"  -Given ischemic CMP with EF 20-25%, LBBB with QRSd of 170ms and frequent PVCs, he would benefit from a CRT-D device  -Patient has previously refused ICD and is still refusing at this time

## 2018-04-30 NOTE — H&P ADULT - PROBLEM SELECTOR PLAN 4
Continue with prednisone 15mg daily   Duoneb q4h   D/W PCP Baseline CKD stage 3   S/P IV fluid   Hold Lasix for now   Monitor electrolytes   Avoid nephrotoxic medications

## 2018-04-30 NOTE — H&P ADULT - ATTENDING COMMENTS
The above recommendations and possible side effects of medications were discussed with the patient and his family. The patient and his family had all questions answered and expressed understanding of the plan.  Physician: Windy Chung [phone]

## 2018-04-30 NOTE — ED ADULT NURSE NOTE - PMH
Afib    Atrial fibrillation    CHF (congestive heart failure)    COPD (chronic obstructive pulmonary disease)    HTN (hypertension)    Hypercholesterolemia    Hypertension    PAD (peripheral artery disease)

## 2018-04-30 NOTE — CONSULT NOTE ADULT - ATTENDING COMMENTS
seen and examined with NP. I agree with H & P, A & P.  Although the current event sounds mechanical, he has signfiicant structural heart disease and frequent episodes of collapse.  I recommend that we proceed with CRT-D.  The patient is reluctant.

## 2018-04-30 NOTE — ED ADULT NURSE NOTE - OBJECTIVE STATEMENT
pt comes from assisted living for syncope pt BIBA Heartrate was in the low thirties  pt hit head.  Pt has new onset A fib heartrate in the 70s at this time pt also as PVC. pt comes from assisted living for syncope pt BIBA Heartrate was in the low thirties  pt hit head.  Pt has  A fib heartrate in the 70s at this time pt also as PVC. 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

## 2018-04-30 NOTE — H&P ADULT - PROBLEM SELECTOR PLAN 7
Hold Metoprolol for bradycardia   Monitor BP Hold Lasix for now   Hold Metoprolol for bradycardia   2D echo  Hold coumadin for supra therapeutic INR   Cardiac monitoring

## 2018-04-30 NOTE — ED PROVIDER NOTE - PHYSICAL EXAMINATION
A&Ox3 no apparent distress. L eyebrow abrasion. CN 2-12 grossly intact without focal neurologic defict. PERRLA, EOMI. Heart RRR no murmur. No JVD. No peripheral edema. Lungs CTA b/l no wheezes, rhonchi, rales. Abdomen soft nontender nondistended. Peripheral pulses present and equal b/l. Head NCAT.

## 2018-05-02 RX ORDER — GENTAMICIN SULFATE 0.1 %
0 OINTMENT (GRAM) TOPICAL
Qty: 0 | Refills: 0 | COMMUNITY

## 2018-05-02 RX ORDER — ROFLUMILAST 500 UG/1
1 TABLET ORAL
Qty: 0 | Refills: 0 | COMMUNITY

## 2018-05-02 RX ORDER — MESALAMINE 400 MG
2 TABLET, DELAYED RELEASE (ENTERIC COATED) ORAL
Qty: 0 | Refills: 0 | COMMUNITY

## 2018-05-02 RX ORDER — SACUBITRIL AND VALSARTAN 24; 26 MG/1; MG/1
1 TABLET, FILM COATED ORAL
Qty: 0 | Refills: 0 | COMMUNITY

## 2018-05-02 RX ORDER — PREGABALIN 225 MG/1
1 CAPSULE ORAL
Qty: 0 | Refills: 0 | COMMUNITY

## 2018-05-02 RX ORDER — IPRATROPIUM/ALBUTEROL SULFATE 18-103MCG
3 AEROSOL WITH ADAPTER (GRAM) INHALATION
Qty: 0 | Refills: 0 | COMMUNITY

## 2018-05-02 RX ORDER — METOPROLOL TARTRATE 50 MG
1 TABLET ORAL
Qty: 0 | Refills: 0 | COMMUNITY

## 2018-05-02 RX ORDER — FUROSEMIDE 40 MG
1 TABLET ORAL
Qty: 0 | Refills: 0 | COMMUNITY

## 2018-05-02 RX ORDER — WARFARIN SODIUM 2.5 MG/1
1 TABLET ORAL
Qty: 0 | Refills: 0 | COMMUNITY

## 2018-05-02 RX ORDER — CLOTRIMAZOLE AND BETAMETHASONE DIPROPIONATE 10; .5 MG/G; MG/G
0 CREAM TOPICAL
Qty: 0 | Refills: 0 | COMMUNITY

## 2020-05-08 NOTE — PATIENT PROFILE ADULT. - HEALTHCARE QUESTIONS, PROFILE
Pt with baseline Cr 1.1-1.2 presented with Cr 1.88 and BUN 52 likely due to prerenal azotemia likely due to dehydration following having diarrhea for few days and not eating and drinking well. responded to fluid therapy and decreased to 1.68. Urine lytes also were compatible with pre-renal causes, s/p 1.5 L of IVF in ED  - improving this AM (Cr 1.33)  - avoid nephrotoxic meds at this time   - monitor kidney function    #Hematuria:   Patient with compliant of blood in urine and UA demonstrating large blood,  likely due to non-obstructing nephrolithiasis as seen on CT vs rhabdomyolysis   - will monitor the urine for gross hematuria   - c/w home Eliquis 5mg BID NONE

## 2020-08-01 NOTE — PHYSICAL THERAPY INITIAL EVALUATION ADULT - DIAGNOSIS, PT EVAL
SW met with pt at bedside to complete discharge assessment.  No PCP but would like to establish care at [facility] and uses [facility] and doesn't want bedside delivery.  No POA or LW.  No needs identified at this time.     08/01/20 1447   Discharge Assessment   Assessment Type Discharge Planning Assessment   Confirmed/corrected address and phone number on facesheet? Yes   Assessment information obtained from? Patient   Communicated expected length of stay with patient/caregiver no   Prior to hospitilization cognitive status: Alert/Oriented   Prior to hospitalization functional status: Independent   Current cognitive status: Alert/Oriented   Current Functional Status: Independent   Lives With other relative(s)   Able to Return to Prior Arrangements yes   Is patient able to care for self after discharge? Yes   Readmission Within the Last 30 Days no previous admission in last 30 days   Patient currently being followed by outpatient case management? No   Patient currently receives any other outside agency services? No   Equipment Currently Used at Home none   Do you have any problems affording any of your prescribed medications? No   Is the patient taking medications as prescribed? yes   Does the patient have transportation home? Yes   Transportation Anticipated family or friend will provide   Does the patient receive services at the [facility]? No   Discharge Plan A Home   DME Needed Upon Discharge  none   Patient/Family in Agreement with Plan yes      weakness

## 2021-01-15 NOTE — ED ADULT NURSE NOTE - CAS EDN DISCHARGE ASSESSMENT
Detail Level: Simple Price (Do Not Change): 0.00 Instructions: This plan will send the code FBSE to the PM system.  DO NOT or CHANGE the price. Awake/Alert and oriented to person, place and time

## 2021-08-12 NOTE — CONSULT NOTE ADULT - PROBLEM SELECTOR RECOMMENDATION 3
Start: 1405
Anastomosis: 1408
End:1417

[place] Bowel Prep Score:     4* 
-right colon:                       *NA  
-transverse colon:                2
-left colon:                           2
-c/w home dose metoprolol 50mg BID  -c/w c/w Plavix 75 mg once daily     LUZ Raygoza, ANP  [id number]

## 2023-05-16 NOTE — H&P ADULT - PROBLEM SELECTOR PROBLEM 9
Quality 110: Preventive Care And Screening: Influenza Immunization: Influenza Immunization Administered during Influenza season Detail Level: Detailed Prophylactic measure

## 2024-09-27 NOTE — DIETITIAN INITIAL EVALUATION ADULT. - PROBLEM SELECTOR PROBLEM 1
Fall